# Patient Record
Sex: MALE | Race: BLACK OR AFRICAN AMERICAN | NOT HISPANIC OR LATINO | Employment: FULL TIME | RURAL
[De-identification: names, ages, dates, MRNs, and addresses within clinical notes are randomized per-mention and may not be internally consistent; named-entity substitution may affect disease eponyms.]

---

## 2021-07-23 ENCOUNTER — OFFICE VISIT (OUTPATIENT)
Dept: FAMILY MEDICINE | Facility: CLINIC | Age: 26
End: 2021-07-23

## 2021-07-23 VITALS
HEIGHT: 67 IN | BODY MASS INDEX: 47.09 KG/M2 | WEIGHT: 300 LBS | DIASTOLIC BLOOD PRESSURE: 104 MMHG | RESPIRATION RATE: 18 BRPM | HEART RATE: 58 BPM | TEMPERATURE: 98 F | OXYGEN SATURATION: 99 % | SYSTOLIC BLOOD PRESSURE: 152 MMHG

## 2021-07-23 DIAGNOSIS — Z20.822 EXPOSURE TO COVID-19 VIRUS: Primary | ICD-10-CM

## 2021-07-23 DIAGNOSIS — R42 DIZZINESS: ICD-10-CM

## 2021-07-23 DIAGNOSIS — H66.92 LEFT OTITIS MEDIA, UNSPECIFIED OTITIS MEDIA TYPE: ICD-10-CM

## 2021-07-23 LAB
CTP QC/QA: YES
SARS-COV-2 AG RESP QL IA.RAPID: NEGATIVE

## 2021-07-23 PROCEDURE — 99203 PR OFFICE/OUTPT VISIT, NEW, LEVL III, 30-44 MIN: ICD-10-PCS | Mod: 25,,, | Performed by: NURSE PRACTITIONER

## 2021-07-23 PROCEDURE — 87426 SARSCOV CORONAVIRUS AG IA: CPT | Mod: QW,,, | Performed by: NURSE PRACTITIONER

## 2021-07-23 PROCEDURE — 96372 THER/PROPH/DIAG INJ SC/IM: CPT | Mod: ,,, | Performed by: NURSE PRACTITIONER

## 2021-07-23 PROCEDURE — 99203 OFFICE O/P NEW LOW 30 MIN: CPT | Mod: 25,,, | Performed by: NURSE PRACTITIONER

## 2021-07-23 PROCEDURE — 96372 PR INJECTION,THERAP/PROPH/DIAG2ST, IM OR SUBCUT: ICD-10-PCS | Mod: ,,, | Performed by: NURSE PRACTITIONER

## 2021-07-23 PROCEDURE — 87426 SARS CORONAVIRUS 2 ANTIGEN POCT: ICD-10-PCS | Mod: QW,,, | Performed by: NURSE PRACTITIONER

## 2021-07-23 RX ORDER — PREDNISONE 20 MG/1
40 TABLET ORAL DAILY
Qty: 10 TABLET | Refills: 0 | Status: SHIPPED | OUTPATIENT
Start: 2021-07-23 | End: 2021-07-28

## 2021-07-23 RX ORDER — CEFDINIR 300 MG/1
300 CAPSULE ORAL 2 TIMES DAILY
Qty: 20 CAPSULE | Refills: 0 | Status: SHIPPED | OUTPATIENT
Start: 2021-07-23 | End: 2021-08-02

## 2021-07-23 RX ORDER — DEXAMETHASONE SODIUM PHOSPHATE 4 MG/ML
6 INJECTION, SOLUTION INTRA-ARTICULAR; INTRALESIONAL; INTRAMUSCULAR; INTRAVENOUS; SOFT TISSUE ONCE
Status: COMPLETED | OUTPATIENT
Start: 2021-07-23 | End: 2021-07-23

## 2021-07-23 RX ORDER — MECLIZINE HCL 12.5 MG 12.5 MG/1
12.5 TABLET ORAL 3 TIMES DAILY PRN
Qty: 30 TABLET | Refills: 0 | Status: SHIPPED | OUTPATIENT
Start: 2021-07-23 | End: 2023-03-16

## 2021-07-23 RX ADMIN — DEXAMETHASONE SODIUM PHOSPHATE 6 MG: 4 INJECTION, SOLUTION INTRA-ARTICULAR; INTRALESIONAL; INTRAMUSCULAR; INTRAVENOUS; SOFT TISSUE at 11:07

## 2021-09-23 ENCOUNTER — CLINICAL SUPPORT (OUTPATIENT)
Dept: PRIMARY CARE CLINIC | Facility: CLINIC | Age: 26
End: 2021-09-23

## 2021-09-23 DIAGNOSIS — Z02.4 ENCOUNTER FOR DEPARTMENT OF TRANSPORTATION (DOT) EXAMINATION FOR DRIVING LICENSE RENEWAL: ICD-10-CM

## 2021-09-23 PROCEDURE — 99499 UNLISTED E&M SERVICE: CPT | Mod: ,,, | Performed by: NURSE PRACTITIONER

## 2021-09-23 PROCEDURE — 99499 PR PHYSICAL - DOT/CDL: ICD-10-PCS | Mod: ,,, | Performed by: NURSE PRACTITIONER

## 2021-12-21 ENCOUNTER — CLINICAL SUPPORT (OUTPATIENT)
Dept: PRIMARY CARE CLINIC | Facility: CLINIC | Age: 26
End: 2021-12-21

## 2021-12-21 DIAGNOSIS — Z02.4 ENCOUNTER FOR COMMERCIAL DRIVER MEDICAL EXAMINATION (CDME): Primary | ICD-10-CM

## 2021-12-21 PROCEDURE — 99499 PR PHYSICAL - DOT/CDL: ICD-10-PCS | Mod: ,,, | Performed by: NURSE PRACTITIONER

## 2021-12-21 PROCEDURE — 99499 UNLISTED E&M SERVICE: CPT | Mod: ,,, | Performed by: NURSE PRACTITIONER

## 2022-03-21 ENCOUNTER — CLINICAL SUPPORT (OUTPATIENT)
Dept: PRIMARY CARE CLINIC | Facility: CLINIC | Age: 27
End: 2022-03-21

## 2022-03-21 DIAGNOSIS — Z02.4 ENCOUNTER FOR DEPARTMENT OF TRANSPORTATION (DOT) EXAMINATION FOR DRIVING LICENSE RENEWAL: ICD-10-CM

## 2022-03-21 PROCEDURE — 99499 PR PHYSICAL - DOT/CDL: ICD-10-PCS | Mod: ,,, | Performed by: NURSE PRACTITIONER

## 2022-03-21 PROCEDURE — 99499 UNLISTED E&M SERVICE: CPT | Mod: ,,, | Performed by: NURSE PRACTITIONER

## 2022-03-21 NOTE — PROGRESS NOTES
Subjective:       Patient ID: Ray Amaya is a 26 y.o. male.    Chief Complaint: No chief complaint on file.    HPI  Review of Systems      Objective:      Physical Exam    Assessment:       Problem List Items Addressed This Visit    None     Visit Diagnoses     Encounter for Department of Transportation (DOT) examination for driving license renewal              Plan:       See scanned documents in .

## 2022-12-28 ENCOUNTER — OFFICE VISIT (OUTPATIENT)
Dept: FAMILY MEDICINE | Facility: CLINIC | Age: 27
End: 2022-12-28
Payer: COMMERCIAL

## 2022-12-28 VITALS
TEMPERATURE: 98 F | WEIGHT: 300 LBS | SYSTOLIC BLOOD PRESSURE: 114 MMHG | OXYGEN SATURATION: 98 % | HEART RATE: 103 BPM | RESPIRATION RATE: 18 BRPM | HEIGHT: 67 IN | DIASTOLIC BLOOD PRESSURE: 50 MMHG | BODY MASS INDEX: 47.09 KG/M2

## 2022-12-28 DIAGNOSIS — J02.9 PHARYNGITIS, UNSPECIFIED ETIOLOGY: ICD-10-CM

## 2022-12-28 DIAGNOSIS — Z20.822 COUGH WITH EXPOSURE TO COVID-19 VIRUS: Primary | ICD-10-CM

## 2022-12-28 DIAGNOSIS — R05.8 COUGH WITH EXPOSURE TO COVID-19 VIRUS: Primary | ICD-10-CM

## 2022-12-28 DIAGNOSIS — J10.1 INFLUENZA A: ICD-10-CM

## 2022-12-28 LAB
CTP QC/QA: YES
FLUAV AG NPH QL: POSITIVE
FLUBV AG NPH QL: NEGATIVE
SARS-COV-2 AG RESP QL IA.RAPID: NEGATIVE

## 2022-12-28 PROCEDURE — 3074F PR MOST RECENT SYSTOLIC BLOOD PRESSURE < 130 MM HG: ICD-10-PCS | Mod: ,,, | Performed by: FAMILY MEDICINE

## 2022-12-28 PROCEDURE — 3008F PR BODY MASS INDEX (BMI) DOCUMENTED: ICD-10-PCS | Mod: ,,, | Performed by: FAMILY MEDICINE

## 2022-12-28 PROCEDURE — 1159F PR MEDICATION LIST DOCUMENTED IN MEDICAL RECORD: ICD-10-PCS | Mod: ,,, | Performed by: FAMILY MEDICINE

## 2022-12-28 PROCEDURE — 99213 PR OFFICE/OUTPT VISIT, EST, LEVL III, 20-29 MIN: ICD-10-PCS | Mod: ,,, | Performed by: FAMILY MEDICINE

## 2022-12-28 PROCEDURE — 3078F PR MOST RECENT DIASTOLIC BLOOD PRESSURE < 80 MM HG: ICD-10-PCS | Mod: ,,, | Performed by: FAMILY MEDICINE

## 2022-12-28 PROCEDURE — 1159F MED LIST DOCD IN RCRD: CPT | Mod: ,,, | Performed by: FAMILY MEDICINE

## 2022-12-28 PROCEDURE — 87428 SARSCOV & INF VIR A&B AG IA: CPT | Mod: QW,,, | Performed by: FAMILY MEDICINE

## 2022-12-28 PROCEDURE — 87428 POCT SARS-COV2 (COVID) WITH FLU ANTIGEN: ICD-10-PCS | Mod: QW,,, | Performed by: FAMILY MEDICINE

## 2022-12-28 PROCEDURE — 3078F DIAST BP <80 MM HG: CPT | Mod: ,,, | Performed by: FAMILY MEDICINE

## 2022-12-28 PROCEDURE — 3008F BODY MASS INDEX DOCD: CPT | Mod: ,,, | Performed by: FAMILY MEDICINE

## 2022-12-28 PROCEDURE — 99213 OFFICE O/P EST LOW 20 MIN: CPT | Mod: ,,, | Performed by: FAMILY MEDICINE

## 2022-12-28 PROCEDURE — 3074F SYST BP LT 130 MM HG: CPT | Mod: ,,, | Performed by: FAMILY MEDICINE

## 2022-12-28 RX ORDER — AMOXICILLIN AND CLAVULANATE POTASSIUM 875; 125 MG/1; MG/1
1 TABLET, FILM COATED ORAL EVERY 12 HOURS
Qty: 14 TABLET | Refills: 0 | Status: SHIPPED | OUTPATIENT
Start: 2022-12-28 | End: 2023-01-04

## 2022-12-28 RX ORDER — OSELTAMIVIR PHOSPHATE 75 MG/1
75 CAPSULE ORAL 2 TIMES DAILY
Qty: 10 CAPSULE | Refills: 0 | Status: SHIPPED | OUTPATIENT
Start: 2022-12-28 | End: 2023-01-02

## 2022-12-28 NOTE — PROGRESS NOTES
"Subjective:       Patient ID: Ray Amaya is a 27 y.o. male.    Chief Complaint: COVID-19 Concerns (Positive home test last Thursday , continues to have symptoms "the seem worse", all family members were on a cruise and have tested poisitive), Nasal Congestion, Cough, Fatigue, and Fever    27 year old male with no PMHx came in for covid testing. He was on a cruise 8 days ago when he started feeling sick. Sickness started with congestion runny nose and weakness. A few days ago he developed a sore throat as well.   Review of Systems   All other systems reviewed and are negative.      Objective:      Physical Exam  Vitals and nursing note reviewed.   Constitutional:       General: He is not in acute distress.     Appearance: Normal appearance. He is normal weight. He is ill-appearing. He is not toxic-appearing.   HENT:      Head: Normocephalic.      Right Ear: Tympanic membrane, ear canal and external ear normal. There is no impacted cerumen.      Left Ear: Tympanic membrane, ear canal and external ear normal. There is no impacted cerumen.      Nose: Congestion and rhinorrhea present.      Mouth/Throat:      Mouth: Mucous membranes are moist.      Pharynx: Oropharyngeal exudate and posterior oropharyngeal erythema present.   Eyes:      Conjunctiva/sclera: Conjunctivae normal.   Cardiovascular:      Rate and Rhythm: Normal rate and regular rhythm.      Pulses: Normal pulses.      Heart sounds: Normal heart sounds.   Pulmonary:      Effort: Pulmonary effort is normal. No respiratory distress.      Breath sounds: Normal breath sounds. No wheezing or rales.   Abdominal:      General: Abdomen is flat.   Skin:     General: Skin is warm.      Coloration: Skin is not jaundiced.      Findings: No bruising or lesion.   Neurological:      Mental Status: He is alert and oriented to person, place, and time.   Psychiatric:         Behavior: Behavior normal.       Assessment:       Problem List Items Addressed This Visit  "         ENT    Pharyngitis     Purulent exudates on exam  Augmentin          Relevant Medications    amoxicillin-clavulanate 875-125mg (AUGMENTIN) 875-125 mg per tablet       ID    Influenza A     Tested positive  Tamiflu ordered         Relevant Medications    oseltamivir (TAMIFLU) 75 MG capsule     Other Visit Diagnoses       Cough with exposure to COVID-19 virus    -  Primary    Relevant Orders    POCT SARS-COV2 (COVID) with Flu Antigen (Completed)              Plan:       Cough with exposure to COVID-19 virus  -     POCT SARS-COV2 (COVID) with Flu Antigen    Influenza A  -     oseltamivir (TAMIFLU) 75 MG capsule; Take 1 capsule (75 mg total) by mouth 2 (two) times daily. for 5 days  Dispense: 10 capsule; Refill: 0    Pharyngitis, unspecified etiology  -     amoxicillin-clavulanate 875-125mg (AUGMENTIN) 875-125 mg per tablet; Take 1 tablet by mouth every 12 (twelve) hours. for 7 days  Dispense: 14 tablet; Refill: 0

## 2023-01-04 NOTE — PROGRESS NOTES
I have reviewed the notes, assessments, and/or procedures performed by Dr. Ferreira, I concur with his documentation of Ray Amaya.

## 2023-03-16 ENCOUNTER — CLINICAL SUPPORT (OUTPATIENT)
Dept: PRIMARY CARE CLINIC | Facility: CLINIC | Age: 28
End: 2023-03-16

## 2023-03-16 DIAGNOSIS — Z02.4 ENCOUNTER FOR DEPARTMENT OF TRANSPORTATION (DOT) EXAMINATION FOR DRIVING LICENSE RENEWAL: Primary | ICD-10-CM

## 2023-03-16 PROCEDURE — 99499 PR PHYSICAL - DOT/CDL: ICD-10-PCS | Mod: ,,, | Performed by: NURSE PRACTITIONER

## 2023-03-16 PROCEDURE — 99499 UNLISTED E&M SERVICE: CPT | Mod: ,,, | Performed by: NURSE PRACTITIONER

## 2023-03-16 NOTE — PROGRESS NOTES
Subjective:       Patient ID: Ray Amaya is a 27 y.o. male.    Chief Complaint: No chief complaint on file.    HPI  Review of Systems      Objective:      Physical Exam    Assessment:       Problem List Items Addressed This Visit    None  Visit Diagnoses       Encounter for Department of Transportation (DOT) examination for driving license renewal    -  Primary            Plan:       See scanned documents in .

## 2023-07-05 ENCOUNTER — PATIENT MESSAGE (OUTPATIENT)
Dept: RESEARCH | Facility: HOSPITAL | Age: 28
End: 2023-07-05

## 2024-02-10 ENCOUNTER — OFFICE VISIT (OUTPATIENT)
Dept: FAMILY MEDICINE | Facility: CLINIC | Age: 29
End: 2024-02-10
Payer: COMMERCIAL

## 2024-02-10 VITALS
WEIGHT: 300 LBS | SYSTOLIC BLOOD PRESSURE: 150 MMHG | DIASTOLIC BLOOD PRESSURE: 87 MMHG | BODY MASS INDEX: 47.09 KG/M2 | RESPIRATION RATE: 18 BRPM | OXYGEN SATURATION: 98 % | HEART RATE: 89 BPM | HEIGHT: 67 IN | TEMPERATURE: 99 F

## 2024-02-10 DIAGNOSIS — M54.42 ACUTE MIDLINE LOW BACK PAIN WITH LEFT-SIDED SCIATICA: Primary | ICD-10-CM

## 2024-02-10 DIAGNOSIS — L02.91 CUTANEOUS ABSCESS, UNSPECIFIED SITE: ICD-10-CM

## 2024-02-10 PROCEDURE — 99051 MED SERV EVE/WKEND/HOLIDAY: CPT | Mod: ,,, | Performed by: FAMILY MEDICINE

## 2024-02-10 PROCEDURE — 3008F BODY MASS INDEX DOCD: CPT | Mod: CPTII,,, | Performed by: FAMILY MEDICINE

## 2024-02-10 PROCEDURE — 3079F DIAST BP 80-89 MM HG: CPT | Mod: CPTII,,, | Performed by: FAMILY MEDICINE

## 2024-02-10 PROCEDURE — 99214 OFFICE O/P EST MOD 30 MIN: CPT | Mod: ,,, | Performed by: FAMILY MEDICINE

## 2024-02-10 PROCEDURE — 1159F MED LIST DOCD IN RCRD: CPT | Mod: CPTII,,, | Performed by: FAMILY MEDICINE

## 2024-02-10 PROCEDURE — 3077F SYST BP >= 140 MM HG: CPT | Mod: CPTII,,, | Performed by: FAMILY MEDICINE

## 2024-02-10 RX ORDER — TIZANIDINE 4 MG/1
TABLET ORAL
Qty: 60 TABLET | Refills: 2 | Status: SHIPPED | OUTPATIENT
Start: 2024-02-10 | End: 2024-05-13 | Stop reason: SDUPTHER

## 2024-02-10 RX ORDER — SULFAMETHOXAZOLE AND TRIMETHOPRIM 800; 160 MG/1; MG/1
1 TABLET ORAL 2 TIMES DAILY
Qty: 20 TABLET | Refills: 1 | Status: SHIPPED | OUTPATIENT
Start: 2024-02-10 | End: 2024-03-05

## 2024-02-10 RX ORDER — TRAMADOL HYDROCHLORIDE 50 MG/1
50 TABLET ORAL EVERY 6 HOURS
Qty: 15 TABLET | Refills: 0 | Status: SHIPPED | OUTPATIENT
Start: 2024-02-10 | End: 2024-03-05

## 2024-02-10 NOTE — PROGRESS NOTES
Subjective     Patient ID: Ray Amaya is a 28 y.o. male.    Chief Complaint: Back Pain (Lower back pain. X Wednesday. Pain radiates down the left leg. No trouble with urinating. ), Rash (Located on bilateral arms, stomach, chest, neck and under the left eye. ), and Cyst (Located on the upper right side of back. Yellow drainage. No pain. No odor. )    Pain began after heavy lifting.  Pain radiates down to his left lower leg.  No numbness or weakness.  Sharp pain.  Patient has an abscess in his right upper back and right forearm.  He said he has had these before.  No fever.  Both areas are draining    Back Pain    Rash    Cyst  Associated symptoms include a rash.     Review of Systems   Musculoskeletal:  Positive for back pain.   Integumentary:  Positive for rash.          Objective     Physical Exam  Constitutional:       General: He is in acute distress.      Appearance: He is not ill-appearing.   Cardiovascular:      Rate and Rhythm: Normal rate and regular rhythm.   Musculoskeletal:      Lumbar back: Spasms and tenderness present. Negative right straight leg raise test and negative left straight leg raise test.        Back:    Skin:     Findings: Lesion (abscess right forearm and right upper back.  The abscess on his forearm is somewhat tense but he has a small draining area.) present.   Neurological:      Mental Status: He is alert.      Motor: No weakness.      Deep Tendon Reflexes: Reflexes normal.            Assessment and Plan     1. Acute midline low back pain with left-sided sciatica  -     X-Ray Lumbar Spine AP And Lateral; Future; Expected date: 02/10/2024  -     Ambulatory referral/consult to Chiropractic; Future; Expected date: 02/17/2024    2. Cutaneous abscess, unspecified site    Other orders  -     sulfamethoxazole-trimethoprim 800-160mg (BACTRIM DS) 800-160 mg Tab; Take 1 tablet by mouth 2 (two) times daily.  Dispense: 20 tablet; Refill: 1  -     tiZANidine (ZANAFLEX) 4 MG tablet; 1 or  2 at bedtime as needed for muscle spasm  Dispense: 60 tablet; Refill: 2  -     traMADoL (ULTRAM) 50 mg tablet; Take 1 tablet (50 mg total) by mouth every 6 (six) hours.  Dispense: 15 tablet; Refill: 0        If the abscess on right forearm does not drained spontaneously he will call for referral to surgery for incision and drainage     Patient referred to Dr. Saldivar for chiropractic care.  If he develops any weakness in his left lower extremity he will come in for recheck    No follow-ups on file.

## 2024-02-27 ENCOUNTER — OFFICE VISIT (OUTPATIENT)
Dept: FAMILY MEDICINE | Facility: CLINIC | Age: 29
End: 2024-02-27
Payer: COMMERCIAL

## 2024-02-27 VITALS
OXYGEN SATURATION: 98 % | RESPIRATION RATE: 16 BRPM | HEART RATE: 75 BPM | SYSTOLIC BLOOD PRESSURE: 129 MMHG | WEIGHT: 303.63 LBS | TEMPERATURE: 98 F | DIASTOLIC BLOOD PRESSURE: 71 MMHG | BODY MASS INDEX: 47.65 KG/M2 | HEIGHT: 67 IN

## 2024-02-27 DIAGNOSIS — M54.50 ACUTE LEFT-SIDED LOW BACK PAIN WITHOUT SCIATICA: Primary | ICD-10-CM

## 2024-02-27 DIAGNOSIS — L02.413 ABSCESS OF ARM, RIGHT: ICD-10-CM

## 2024-02-27 PROCEDURE — 3008F BODY MASS INDEX DOCD: CPT | Mod: CPTII,,, | Performed by: FAMILY MEDICINE

## 2024-02-27 PROCEDURE — 3078F DIAST BP <80 MM HG: CPT | Mod: CPTII,,, | Performed by: FAMILY MEDICINE

## 2024-02-27 PROCEDURE — 3074F SYST BP LT 130 MM HG: CPT | Mod: CPTII,,, | Performed by: FAMILY MEDICINE

## 2024-02-27 PROCEDURE — 99212 OFFICE O/P EST SF 10 MIN: CPT | Mod: ,,, | Performed by: FAMILY MEDICINE

## 2024-02-27 PROCEDURE — 1160F RVW MEDS BY RX/DR IN RCRD: CPT | Mod: CPTII,,, | Performed by: FAMILY MEDICINE

## 2024-02-27 PROCEDURE — 1159F MED LIST DOCD IN RCRD: CPT | Mod: CPTII,,, | Performed by: FAMILY MEDICINE

## 2024-02-27 NOTE — PROGRESS NOTES
"      Suleiman Davis MD    [address], [place] [address]  Phone: [phone]     Subjective     Name: Ray Amaya   YOB: 1995 (28 y.o.)  MRN: [id number]  Visit Date: 2/27/2024   Chief Complaint: Follow-up (FEVER ON [id number], FATIGUE, BODY ACHES, WEAK SENT HOME FROM WORK IN [place], HOME TEST FOR COVID SATURDAY NEGATIVE, FEELS FINE TODAY  WOULD LIKE TO RETURN TO WORK, NEEDS WORK EXCUSE TO RETURN TODAY IF POSSIBLE) and Leg Pain (LOW BACK INJURY 1 MONTTH AGO AT WORK, IT IS BETTER, "PULLED MUSCLE", NOW HAS DISCOMFORT IN LEFT LEG AND KNEE)        HISTORY OF PRESENT ILLNESS:  Mr. Ray Amaya 28-year-old  without any significant past medical history who presents to the [facility] for acute lower back discomfort for the past few weeks. Reports that while at work he did heavy lifting a turp with roughly 30-50 lbs and felt left lower back pain with radiating to the left anterior of the leg. Patient did follow-up with a Dr. Home Saldivar on 02/10. At that time, imaging disclosed no acute evident. Encouraged to visit a chiropractor. States taking Ibuprofen with relief.     In addition, patient disclosed that he was feeling sick on last week with symptoms of weakness, light-headiness, chills and fever, in which, prompt visit to the [facility] ED. Patient went to the local pharmacy and took a home COVID test that was negative. At visit today, symptoms has resolved. States that he has a right arm abscess for past few days that drains a little. Encouraged to apply warm cloth compression for 15-minutes QID. He was started on Bactrim and ointment. RTC in one week for possible I&D and back pain discomfort.         PAST MEDICAL HISTORY:  Significant Diagnoses - Patient  has no past medical history on file.  Medications - Patient is not on any long-term medications.   Allergies - Patient has No Known Allergies.  Surgeries - Patient  has no past " "surgical history on file.  Family History - Patient family history is not on file.      SOCIAL HISTORY:  Tobacco - Patient  reports that he has been smoking. He has never used smokeless tobacco.   Alcohol - Patient  reports current alcohol use.   Recreational Drugs - Patient  reports no history of drug use.       Review of Systems   Constitutional: Negative.    HENT: Negative.     Eyes: Negative.    Respiratory: Negative.     Cardiovascular: Negative.    Gastrointestinal: Negative.    Musculoskeletal:  Positive for back pain and myalgias.   Neurological: Negative.         History reviewed. No pertinent past medical history.     Review of patient's allergies indicates:  No Known Allergies     History reviewed. No pertinent surgical history.     History reviewed. No pertinent family history.    Current Outpatient Medications   Medication Instructions    sulfamethoxazole-trimethoprim 800-160mg (BACTRIM DS) 800-160 mg Tab 1 tablet, Oral, 2 times daily    tiZANidine (ZANAFLEX) 4 MG tablet 1 or 2 at bedtime as needed for muscle spasm    traMADoL (ULTRAM) 50 mg, Oral, Every 6 hours        Objective     /71 (BP Location: Left arm, Patient Position: Sitting, BP Method: Large (Automatic))   Pulse 75   Temp 98.2 °F (36.8 °C) (Oral)   Resp 16   Ht 5' 7" (1.702 m)   Wt (!) 137.7 kg (303 lb 9.6 oz)   SpO2 98%   BMI 47.55 kg/m²     Physical Exam  Vitals and nursing note reviewed.   Constitutional:       General: He is not in acute distress.     Appearance: Normal appearance. He is not ill-appearing.   HENT:      Head: Normocephalic and atraumatic.   Cardiovascular:      Rate and Rhythm: Normal rate.      Heart sounds: No murmur heard.     No friction rub. No gallop.   Pulmonary:      Effort: No respiratory distress.   Chest:      Chest wall: No tenderness.   Abdominal:      General: There is no distension.   Musculoskeletal:         General: Tenderness present. No swelling.      Cervical back: Normal range of motion. "      Right lower leg: No edema.      Left lower leg: No edema.      Comments: Left lower back tenderness; left leg anterior tenderness below the patella    Neurological:      Mental Status: He is alert.        All recently obtained labs have been reviewed and discussed in detail with the patient.   Assessment     1. Acute left-sided low back pain without sciatica    2. Abscess of arm, right         Plan     Problem List Items Addressed This Visit          ID    Abscess of arm, right     02/27  - Reports right arm abscess for the past weeks  -Started on Bactrim 800-160 and applying ointment from previous provider  -Encouraged to continue treatment; RTC in one week for possible I & D            Orthopedic    Low back pain - Primary     02/27  - Reports that on last Friday lifted a roughly 30-50 lbs tarp and experienced left lower discomfort with radiating to the left anterior leg  -started taking Ibuprofen with relief.   -States having imaging done at another facility but no results reviewed; per chart review imaging was unremarkable  -Patient was referred to Chiropractor from previous provider; encouraged PT  -At this time, PT and/or Chiropractor, tylenol for pain; started on Zanaflex 4 mg from previous provider    -RTC in one-week                All orders:          Follow up in about 1 week (around 3/5/2024).    Instructed patient that if symptoms fail to improve or worsen patient should seek immediate medical attention or report to the nearest emergency department. Patient expressed verbal agreement and understanding to this plan of care.   Suleiman Davis MD  Family Medicine Residency PGY-1    [facility]

## 2024-02-27 NOTE — ASSESSMENT & PLAN NOTE
02/27  - Reports right arm abscess for the past weeks  -Started on Bactrim 800-160 and applying ointment from previous provider  -Encouraged to continue treatment; RTC in one week for possible I & D

## 2024-02-27 NOTE — PROGRESS NOTES
I have reviewed the notes, assessments, and/or procedures performed by DR OVALLE,   I concur with his documentation of Ray Amaya.  Date of Service: 2/27/2024   Monitor WORSENING SIGNS OF INFECTION WITH ABSCESS NOT FLUCTUANT TODAY,Low back pain went over red flags signs symptoms worsening pain, bowel bladder anesthesia  Evaluate low back sprain after lifting heavy tarp suspected pulled paravertebral muscle  Assess acute low back pain after heavy lifting  Treat referral for chiropractor and consider PT referral as well and zanaflex return for follow-up in 1 week for possible abscess drainage.

## 2024-02-27 NOTE — ASSESSMENT & PLAN NOTE
02/27  - Reports that on last Friday lifted a roughly 30-50 lbs tarp and experienced left lower discomfort with radiating to the left anterior leg  -started taking Ibuprofen with relief.   -States having imaging done at another facility but no results reviewed; per chart review imaging was unremarkable  -Patient was referred to Chiropractor from previous provider; encouraged PT  -At this time, PT and/or Chiropractor, tylenol for pain; started on Zanaflex 4 mg from previous provider    -RTC in one-week

## 2024-03-05 ENCOUNTER — OFFICE VISIT (OUTPATIENT)
Dept: FAMILY MEDICINE | Facility: CLINIC | Age: 29
End: 2024-03-05
Payer: COMMERCIAL

## 2024-03-05 VITALS
OXYGEN SATURATION: 99 % | SYSTOLIC BLOOD PRESSURE: 132 MMHG | RESPIRATION RATE: 18 BRPM | WEIGHT: 308 LBS | DIASTOLIC BLOOD PRESSURE: 88 MMHG | BODY MASS INDEX: 48.34 KG/M2 | HEART RATE: 72 BPM | TEMPERATURE: 98 F | HEIGHT: 67 IN

## 2024-03-05 DIAGNOSIS — Z11.3 ROUTINE SCREENING FOR STI (SEXUALLY TRANSMITTED INFECTION): ICD-10-CM

## 2024-03-05 DIAGNOSIS — G47.33 OSA (OBSTRUCTIVE SLEEP APNEA): ICD-10-CM

## 2024-03-05 DIAGNOSIS — Z76.89 ENCOUNTER TO ESTABLISH CARE: Primary | ICD-10-CM

## 2024-03-05 DIAGNOSIS — Z23 NEED FOR VACCINATION: ICD-10-CM

## 2024-03-05 PROBLEM — Z11.59 NEED FOR HEPATITIS C SCREENING TEST: Status: ACTIVE | Noted: 2024-03-05

## 2024-03-05 PROBLEM — Z20.822 EXPOSURE TO COVID-19 VIRUS: Status: RESOLVED | Noted: 2021-07-23 | Resolved: 2024-03-05

## 2024-03-05 PROBLEM — Z11.4 SCREENING FOR HIV (HUMAN IMMUNODEFICIENCY VIRUS): Status: RESOLVED | Noted: 2024-03-05 | Resolved: 2024-03-05

## 2024-03-05 PROBLEM — J02.9 PHARYNGITIS: Status: RESOLVED | Noted: 2022-12-28 | Resolved: 2024-03-05

## 2024-03-05 PROBLEM — Z11.4 SCREENING FOR HIV (HUMAN IMMUNODEFICIENCY VIRUS): Status: ACTIVE | Noted: 2024-03-05

## 2024-03-05 PROBLEM — Z11.59 NEED FOR HEPATITIS C SCREENING TEST: Status: RESOLVED | Noted: 2024-03-05 | Resolved: 2024-03-05

## 2024-03-05 PROBLEM — J10.1 INFLUENZA A: Status: RESOLVED | Noted: 2022-12-28 | Resolved: 2024-03-05

## 2024-03-05 PROBLEM — H66.92 LEFT OTITIS MEDIA: Status: RESOLVED | Noted: 2021-07-23 | Resolved: 2024-03-05

## 2024-03-05 LAB
ALBUMIN SERPL BCP-MCNC: 3.9 G/DL (ref 3.5–5)
ALBUMIN/GLOB SERPL: 1.1 {RATIO}
ALP SERPL-CCNC: 54 U/L (ref 45–115)
ALT SERPL W P-5'-P-CCNC: 65 U/L (ref 16–61)
ANION GAP SERPL CALCULATED.3IONS-SCNC: 7 MMOL/L (ref 7–16)
ANISOCYTOSIS BLD QL SMEAR: ABNORMAL
AST SERPL W P-5'-P-CCNC: 27 U/L (ref 15–37)
BASOPHILS # BLD AUTO: 0.04 K/UL (ref 0–0.2)
BASOPHILS NFR BLD AUTO: 0.7 % (ref 0–1)
BILIRUB SERPL-MCNC: 0.4 MG/DL (ref ?–1.2)
BUN SERPL-MCNC: 9 MG/DL (ref 7–18)
BUN/CREAT SERPL: 8 (ref 6–20)
CALCIUM SERPL-MCNC: 9.8 MG/DL (ref 8.5–10.1)
CHLORIDE SERPL-SCNC: 108 MMOL/L (ref 98–107)
CHOLEST SERPL-MCNC: 192 MG/DL (ref 0–200)
CHOLEST/HDLC SERPL: 5.1 {RATIO}
CO2 SERPL-SCNC: 30 MMOL/L (ref 21–32)
CREAT SERPL-MCNC: 1.14 MG/DL (ref 0.7–1.3)
DIFFERENTIAL METHOD BLD: ABNORMAL
EGFR (NO RACE VARIABLE) (RUSH/TITUS): 89 ML/MIN/1.73M2
EOSINOPHIL # BLD AUTO: 0.1 K/UL (ref 0–0.5)
EOSINOPHIL NFR BLD AUTO: 1.7 % (ref 1–4)
ERYTHROCYTE [DISTWIDTH] IN BLOOD BY AUTOMATED COUNT: 15.4 % (ref 11.5–14.5)
GLOBULIN SER-MCNC: 3.7 G/DL (ref 2–4)
GLUCOSE SERPL-MCNC: 76 MG/DL (ref 74–106)
HCT VFR BLD AUTO: 43.4 % (ref 40–54)
HCV AB SER QL: NORMAL
HDLC SERPL-MCNC: 38 MG/DL (ref 40–60)
HGB BLD-MCNC: 13.9 G/DL (ref 13.5–18)
HIV 1+O+2 AB SERPL QL: NORMAL
IMM GRANULOCYTES # BLD AUTO: 0.01 K/UL (ref 0–0.04)
IMM GRANULOCYTES NFR BLD: 0.2 % (ref 0–0.4)
LDLC SERPL CALC-MCNC: 123 MG/DL
LDLC/HDLC SERPL: 3.2 {RATIO}
LYMPHOCYTES # BLD AUTO: 2.5 K/UL (ref 1–4.8)
LYMPHOCYTES NFR BLD AUTO: 42.4 % (ref 27–41)
MCH RBC QN AUTO: 23.5 PG (ref 27–31)
MCHC RBC AUTO-ENTMCNC: 32 G/DL (ref 32–36)
MCV RBC AUTO: 73.3 FL (ref 80–96)
MICROCYTES BLD QL SMEAR: ABNORMAL
MONOCYTES # BLD AUTO: 0.72 K/UL (ref 0–0.8)
MONOCYTES NFR BLD AUTO: 12.2 % (ref 2–6)
MPC BLD CALC-MCNC: 11 FL (ref 9.4–12.4)
NEUTROPHILS # BLD AUTO: 2.52 K/UL (ref 1.8–7.7)
NEUTROPHILS NFR BLD AUTO: 42.8 % (ref 53–65)
NONHDLC SERPL-MCNC: 154 MG/DL
NRBC # BLD AUTO: 0 X10E3/UL
NRBC, AUTO (.00): 0 %
OVALOCYTES BLD QL SMEAR: ABNORMAL
PLATELET # BLD AUTO: 305 K/UL (ref 150–400)
PLATELET MORPHOLOGY: ABNORMAL
POTASSIUM SERPL-SCNC: 4.7 MMOL/L (ref 3.5–5.1)
PROT SERPL-MCNC: 7.6 G/DL (ref 6.4–8.2)
RBC # BLD AUTO: 5.92 M/UL (ref 4.6–6.2)
SODIUM SERPL-SCNC: 140 MMOL/L (ref 136–145)
SYPHILIS AB INTERPRETATION: NORMAL
TRIGL SERPL-MCNC: 154 MG/DL (ref 35–150)
VLDLC SERPL-MCNC: 31 MG/DL
WBC # BLD AUTO: 5.89 K/UL (ref 4.5–11)

## 2024-03-05 PROCEDURE — 86803 HEPATITIS C AB TEST: CPT | Mod: ,,, | Performed by: CLINICAL MEDICAL LABORATORY

## 2024-03-05 PROCEDURE — 1160F RVW MEDS BY RX/DR IN RCRD: CPT | Mod: CPTII,,, | Performed by: FAMILY MEDICINE

## 2024-03-05 PROCEDURE — 83036 HEMOGLOBIN GLYCOSYLATED A1C: CPT | Mod: GZ,,, | Performed by: CLINICAL MEDICAL LABORATORY

## 2024-03-05 PROCEDURE — 90715 TDAP VACCINE 7 YRS/> IM: CPT | Mod: ,,, | Performed by: FAMILY MEDICINE

## 2024-03-05 PROCEDURE — 80053 COMPREHEN METABOLIC PANEL: CPT | Mod: ,,, | Performed by: CLINICAL MEDICAL LABORATORY

## 2024-03-05 PROCEDURE — 99214 OFFICE O/P EST MOD 30 MIN: CPT | Mod: 25,,, | Performed by: FAMILY MEDICINE

## 2024-03-05 PROCEDURE — 86780 TREPONEMA PALLIDUM: CPT | Mod: ,,, | Performed by: CLINICAL MEDICAL LABORATORY

## 2024-03-05 PROCEDURE — 3079F DIAST BP 80-89 MM HG: CPT | Mod: CPTII,,, | Performed by: FAMILY MEDICINE

## 2024-03-05 PROCEDURE — 3044F HG A1C LEVEL LT 7.0%: CPT | Mod: CPTII,,, | Performed by: FAMILY MEDICINE

## 2024-03-05 PROCEDURE — 87389 HIV-1 AG W/HIV-1&-2 AB AG IA: CPT | Mod: ,,, | Performed by: CLINICAL MEDICAL LABORATORY

## 2024-03-05 PROCEDURE — 87491 CHLMYD TRACH DNA AMP PROBE: CPT | Mod: ,,, | Performed by: CLINICAL MEDICAL LABORATORY

## 2024-03-05 PROCEDURE — 87591 N.GONORRHOEAE DNA AMP PROB: CPT | Mod: ,,, | Performed by: CLINICAL MEDICAL LABORATORY

## 2024-03-05 PROCEDURE — 90471 IMMUNIZATION ADMIN: CPT | Mod: ,,, | Performed by: FAMILY MEDICINE

## 2024-03-05 PROCEDURE — 1159F MED LIST DOCD IN RCRD: CPT | Mod: CPTII,,, | Performed by: FAMILY MEDICINE

## 2024-03-05 PROCEDURE — 85025 COMPLETE CBC W/AUTO DIFF WBC: CPT | Mod: GZ,,, | Performed by: CLINICAL MEDICAL LABORATORY

## 2024-03-05 PROCEDURE — 3008F BODY MASS INDEX DOCD: CPT | Mod: CPTII,,, | Performed by: FAMILY MEDICINE

## 2024-03-05 PROCEDURE — 3075F SYST BP GE 130 - 139MM HG: CPT | Mod: CPTII,,, | Performed by: FAMILY MEDICINE

## 2024-03-05 PROCEDURE — 80061 LIPID PANEL: CPT | Mod: ,,, | Performed by: CLINICAL MEDICAL LABORATORY

## 2024-03-05 NOTE — ASSESSMENT & PLAN NOTE
03/05  - Patient was seen on the 02/2024 for back pain due to work-related injury\  -At that time, encouraged to visit a PT  -Patient states that symptoms are improving  -At visit today, establishing patient-physician care  -Denies any PMH; has CPAP; referral sent to sleep medicine due to follow-up since 2016  -Ordered routine labs with STI screening;patient request  -Blood pressure was elevated at 141/90; repeat 132/88  - Discussed dieting and exercise to help with weigh-loss; patient a  with limited options of nutrients   - RTC in 3-month for follow-up

## 2024-03-05 NOTE — PROGRESS NOTES
Suleiman Davis MD    [address], [place] [address]  Phone: [phone]     Subjective     Name: Ray Amaya   YOB: 1995 (28 y.o.)  MRN: [id number]  Visit Date: 3/5/2024   Chief Complaint: Abscess (1 week follow up for abscess of right arm, he states it is much better.) and Health Maintenance (Care gaps addressed, patient declines his Flu and Pneumonia vaccines today, would like Tdap and HIV and Hepatitis C screening.)        HISTORY OF PRESENT ILLNESS:  Ray Amaya is a 28-year-old  male with no significant past medical history who presents to the [facility] for establish care and screenings. Reports ready to have a primary care provider. Patient is a  and over the road often. 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 Also, meal preparation can aid his success in meals. Encouraged patient to exercise daily; educated to walk on his breaks and to sign-up for gym membership on his off-days.    Patient Mentioned that he has a CPAP but will need to be refitted; has not seen a sleep medicine physician since year 2016. Referral placed. At this time, ordered routine lab with STI panels. Results pending. RTC in 3-months.            PAST MEDICAL HISTORY:  Significant Diagnoses - Patient  has no past medical history on file.  Medications - Patient is not on any long-term medications.   Allergies - Patient has No Known Allergies.  Surgeries - Patient  has a past surgical history that includes Tonsillectomy.  Family History - Patient family history includes Diabetes in his maternal grandmother; Hypertension in his father, maternal grandmother, and mother.      SOCIAL HISTORY:  Tobacco - Patient  reports that he has been smoking cigars. He has never used smokeless tobacco.  "  Alcohol - Patient  reports current alcohol use.   Recreational Drugs - Patient  reports no history of drug use.       Review of Systems   Constitutional: Negative.    Respiratory: Negative.     Cardiovascular: Negative.    Gastrointestinal: Negative.    Musculoskeletal:  Positive for myalgias.   Psychiatric/Behavioral: Negative.            History reviewed. No pertinent past medical history.     Review of patient's allergies indicates:  No Known Allergies     Past Surgical History:   Procedure Laterality Date    TONSILLECTOMY          Family History   Problem Relation Age of Onset    Hypertension Mother     Hypertension Father     Diabetes Maternal Grandmother     Hypertension Maternal Grandmother        Current Outpatient Medications   Medication Instructions    tiZANidine (ZANAFLEX) 4 MG tablet 1 or 2 at bedtime as needed for muscle spasm        Objective     /88 (BP Location: Left arm, Patient Position: Sitting, BP Method: Large (Manual))   Pulse 72   Temp 98.2 °F (36.8 °C) (Oral)   Resp 18   Ht 5' 7" (1.702 m)   Wt (!) 139.7 kg (308 lb)   SpO2 99%   BMI 48.24 kg/m²     Physical Exam  Vitals and nursing note reviewed.   Constitutional:       General: He is not in acute distress.     Appearance: Normal appearance. He is obese. He is not ill-appearing.   HENT:      Head: Normocephalic and atraumatic.      Nose: Nose normal.   Eyes:      Extraocular Movements: Extraocular movements intact.      Conjunctiva/sclera: Conjunctivae normal.   Cardiovascular:      Rate and Rhythm: Normal rate.      Heart sounds: No murmur heard.     No friction rub. No gallop.   Pulmonary:      Effort: No respiratory distress.   Chest:      Chest wall: No tenderness.   Abdominal:      General: There is no distension.   Musculoskeletal:         General: Tenderness present.      Cervical back: Normal range of motion.      Right lower leg: No edema.      Left lower leg: No edema.      Comments: Left-Lower tenderness; paraspinal " muscles    Skin:     Coloration: Skin is not jaundiced.      Findings: No erythema.   Neurological:      Mental Status: He is alert and oriented to person, place, and time.   Psychiatric:         Mood and Affect: Mood normal.         Thought Content: Thought content normal.          All recently obtained labs have been reviewed and discussed in detail with the patient.   Assessment     1. Encounter to establish care    2. Need for vaccination    3. Routine screening for STI (sexually transmitted infection)    4. PAULA (obstructive sleep apnea)         Plan     Problem List Items Addressed This Visit          ID    Need for vaccination     03/05  - Administered Tdap vaccine          Relevant Orders    (In Office Administered) Tdap Vaccine (Completed)    Routine screening for STI (sexually transmitted infection)     03/05  - Reports will like to be screen for any STI  -Ordered a panel   -Results pending          Relevant Orders    Chlamydia/GC, PCR    HIV 1/2 Ag/Ab (4th Gen)    Syphilis Antibody with reflex to RPR    Hepatitis C Antibody       Other    Encounter to establish care - Primary     03/05  - Patient was seen on the 02/2024 for back pain due to work-related injury\  -At that time, encouraged to visit a PT  -Patient states that symptoms are improving  -At visit today, establishing patient-physician care  -Denies any PMH; has CPAP; referral sent to sleep medicine due to follow-up since 2016  -Ordered routine labs with STI screening;patient request  -Blood pressure was elevated at 141/90; repeat 132/88  - Discussed dieting and exercise to help with weigh-loss; patient a  with limited options of nutrients   - RTC in 3-month for follow-up           Relevant Orders    Lipid Panel    CBC Auto Differential    Comprehensive Metabolic Panel    Hemoglobin A1C     Other Visit Diagnoses       PAULA (obstructive sleep apnea)        Relevant Orders    Ambulatory referral/consult to Sleep Disorders              All  orders:  Orders Placed This Encounter    Chlamydia/GC, PCR    (In Office Administered) Tdap Vaccine    Lipid Panel    CBC Auto Differential    Comprehensive Metabolic Panel    Hemoglobin A1C    HIV 1/2 Ag/Ab (4th Gen)    Syphilis Antibody with reflex to RPR    Hepatitis C Antibody    CBC with Differential          Follow up in about 3 months (around 6/5/2024).    Instructed patient that if symptoms fail to improve or worsen patient should seek immediate medical attention or report to the nearest emergency department. Patient expressed verbal agreement and understanding to this plan of care.   Suleiman Davis MD  Family Medicine Residency PGY-1    [facility]

## 2024-03-05 NOTE — LETTER
March 5, 2024      [facility]  [address]  [place] [address]  Phone: [phone]  Fax: [phone]       Patient: Ray Amaya   YOB: 1995  Date of Visit: 03/05/2024    To Whom It May Concern:    Sita Amaya  was at [facility] on 03/05/2024. The patient may return to work/school on 03/06/2023 with no restrictions. If you have any questions or concerns, or if I can be of further assistance, please do not hesitate to contact me.    Sincerely,    Suleiman Davis MD

## 2024-03-06 DIAGNOSIS — D50.8 IRON DEFICIENCY ANEMIA SECONDARY TO INADEQUATE DIETARY IRON INTAKE: Primary | ICD-10-CM

## 2024-03-06 LAB
CHLAMYDIA BY PCR: NEGATIVE
EST. AVERAGE GLUCOSE BLD GHB EST-MCNC: 108 MG/DL
HBA1C MFR BLD HPLC: 5.4 % (ref 4.5–6.6)
N. GONORRHOEAE (GC) BY PCR: NEGATIVE

## 2024-03-06 RX ORDER — FERROUS SULFATE 324(65)MG
324 TABLET, DELAYED RELEASE (ENTERIC COATED) ORAL DAILY
Qty: 90 TABLET | Refills: 0 | Status: SHIPPED | OUTPATIENT
Start: 2024-03-06 | End: 2024-06-04

## 2024-03-06 NOTE — PROGRESS NOTES
Spoke with patient via phone. Encouraged to modify diet. At this time, started on Ferrous sulfate with Vitamin C. RTC in 3-months for repeat labs

## 2024-03-07 NOTE — PROGRESS NOTES
I have reviewed the notes, assessments, and/or procedures performed by DR OVALLE, I concur with his documentation of Ray Amaya.  Date of Service: 3/5/2024  Encounter to establish care - Primary        03/05  - MONITOR  for back pain due to work-related injury\  -At that time, encouraged to visit a PT  -Patient states that symptoms are improving  -EVALUATE At visit today, establishing patient-physician care  -Denies any PMH; has CPAP; referral sent to sleep medicine due to follow-up since 2016  -Ordered routine labs with STI screening;patient request  -Blood pressure was elevated at 141/90; repeat 132/88  - ASSESSMENT ESTABLISH CARE VISIT,HIGH RISK SEX STD WORKUP  TREATMENT:Discussed dieting and exercise to help with weigh-loss; patient a  with limited options of nutrients ,STD check today.  - RTC in 3-month for follow-up             Relevant Orders     Lipid Panel     CBC Auto Differential     Comprehensive Metabolic Panel     Hemoglobin A1C

## 2024-04-22 DIAGNOSIS — G47.33 OSA (OBSTRUCTIVE SLEEP APNEA): Primary | ICD-10-CM

## 2024-04-28 ENCOUNTER — PROCEDURE VISIT (OUTPATIENT)
Dept: SLEEP MEDICINE | Facility: HOSPITAL | Age: 29
End: 2024-04-28
Payer: COMMERCIAL

## 2024-04-28 DIAGNOSIS — G47.33 OSA (OBSTRUCTIVE SLEEP APNEA): ICD-10-CM

## 2024-04-28 PROCEDURE — 95811 POLYSOM 6/>YRS CPAP 4/> PARM: CPT | Mod: PO

## 2024-05-13 ENCOUNTER — OFFICE VISIT (OUTPATIENT)
Dept: FAMILY MEDICINE | Facility: CLINIC | Age: 29
End: 2024-05-13
Payer: COMMERCIAL

## 2024-05-13 VITALS
OXYGEN SATURATION: 98 % | RESPIRATION RATE: 18 BRPM | SYSTOLIC BLOOD PRESSURE: 138 MMHG | TEMPERATURE: 99 F | HEIGHT: 67 IN | WEIGHT: 306.19 LBS | DIASTOLIC BLOOD PRESSURE: 83 MMHG | BODY MASS INDEX: 48.06 KG/M2 | HEART RATE: 60 BPM

## 2024-05-13 DIAGNOSIS — M54.42 ACUTE MIDLINE LOW BACK PAIN WITH LEFT-SIDED SCIATICA: Primary | ICD-10-CM

## 2024-05-13 DIAGNOSIS — Z71.3 DIETARY COUNSELING: ICD-10-CM

## 2024-05-13 PROBLEM — M54.9 ACUTE MIDLINE BACK PAIN: Status: ACTIVE | Noted: 2024-05-13

## 2024-05-13 PROCEDURE — 1160F RVW MEDS BY RX/DR IN RCRD: CPT | Mod: CPTII,,, | Performed by: FAMILY MEDICINE

## 2024-05-13 PROCEDURE — 3008F BODY MASS INDEX DOCD: CPT | Mod: CPTII,,, | Performed by: FAMILY MEDICINE

## 2024-05-13 PROCEDURE — 99214 OFFICE O/P EST MOD 30 MIN: CPT | Mod: ,,, | Performed by: FAMILY MEDICINE

## 2024-05-13 PROCEDURE — 1159F MED LIST DOCD IN RCRD: CPT | Mod: CPTII,,, | Performed by: FAMILY MEDICINE

## 2024-05-13 PROCEDURE — 3044F HG A1C LEVEL LT 7.0%: CPT | Mod: CPTII,,, | Performed by: FAMILY MEDICINE

## 2024-05-13 PROCEDURE — 3079F DIAST BP 80-89 MM HG: CPT | Mod: CPTII,,, | Performed by: FAMILY MEDICINE

## 2024-05-13 PROCEDURE — 3075F SYST BP GE 130 - 139MM HG: CPT | Mod: CPTII,,, | Performed by: FAMILY MEDICINE

## 2024-05-13 RX ORDER — TIZANIDINE 4 MG/1
TABLET ORAL
Qty: 60 TABLET | Refills: 2 | Status: SHIPPED | OUTPATIENT
Start: 2024-05-13

## 2024-05-13 NOTE — PROGRESS NOTES
Jana Mena MD MPH  [address], [place] [address]  Phone: [phone]     Subjective     Name: Ray Amaya   YOB: 1995 (28 y.o.)  MRN: [id number]  Visit Date: 5/13/2024   Chief Complaint: Back Pain (Patient c/o ongoing lower back pain from his last visit that radiates down his leg and makes his toes feel tingly. He states a 7 on the numerical pain scale today, states it was a 10 yesterday.) and Health Maintenance (Care gaps not addressed, patient ill today.//Maria Victoria Mccollum CMA)        HISTORY OF PRESENT ILLNESS:  Patient is a 27 yo male with no significant PMH. He came to the clinic for a walk in appointment with complaint of lower back pain. Patient reported that he has been complaining lower back pain for last 4 days. The pain started in the lower lumbar region as a soreness. The pain was 1-2/10 on pain scale. It was non radiating. He denied any weakness, or numbness. He denied any trauma to the back or falls.  On Saturday, his back pain got worsened. It was 5/10 on pain scale. It was radiating to his left leg. He experienced numbness and tingling sensation in the left LE. He was not able to put pressure on his limb s/s to pain. He took ibuprofen and Tizanidine for pain with mild to no relief.  He denied urinary or fecal incontinence.  On Sunday, the pain worsened and was 10/10 on pain scale.  On the day of encounter, he was not in pain but he complained of numbness in his left toes.  On examination there was no swelling, tenderness, or skin changes in the Left LE.        PAST MEDICAL HISTORY:  Significant Diagnoses - Patient  has a past medical history of Iron deficiency anemia, unspecified.  Medications - Patient is not on any long-term medications.   Allergies - Patient has No Known Allergies.  Surgeries - Patient  has a past surgical history that includes Tonsillectomy.  Family History - Patient family history includes Diabetes in his maternal grandmother;  Hypertension in his father, maternal grandmother, and mother.      SOCIAL HISTORY:  Tobacco - Patient  reports that he has been smoking cigars. He has never used smokeless tobacco.   Alcohol - Patient  reports current alcohol use.   Recreational Drugs - Patient  reports no history of drug use.       Review of Systems   Constitutional:  Negative for activity change, appetite change, chills, fatigue and fever.   HENT:  Negative for nasal congestion, ear discharge, ear pain, hearing loss, postnasal drip, rhinorrhea, sinus pressure/congestion and sore throat.    Eyes:  Negative for discharge, itching and visual disturbance.   Respiratory:  Negative for cough, choking, chest tightness, shortness of breath and wheezing.    Cardiovascular:  Negative for chest pain, palpitations, leg swelling and claudication.   Gastrointestinal:  Negative for abdominal distention, abdominal pain, constipation, diarrhea, nausea, vomiting and reflux.   Genitourinary:  Negative for difficulty urinating, dysuria, frequency, hematuria and urgency.   Musculoskeletal:  Positive for back pain, gait problem and leg pain. Negative for arthralgias, joint swelling and myalgias.   Integumentary:  Negative for rash.   Neurological:  Negative for tremors, light-headedness, numbness and headaches.          Past Medical History:   Diagnosis Date    Iron deficiency anemia, unspecified         Review of patient's allergies indicates:  No Known Allergies     Past Surgical History:   Procedure Laterality Date    TONSILLECTOMY          Family History   Problem Relation Name Age of Onset    Hypertension Mother      Hypertension Father      Diabetes Maternal Grandmother      Hypertension Maternal Grandmother         Current Outpatient Medications   Medication Instructions    ferrous sulfate 324 mg, Oral, Daily    tiZANidine (ZANAFLEX) 4 MG tablet 1 or 2 at bedtime as needed for muscle spasm        Objective     /83 (BP Location: Left arm, Patient Position:  "Sitting, BP Method: Large (Automatic))   Pulse 60   Temp 98.5 °F (36.9 °C) (Oral)   Resp 18   Ht 5' 7" (1.702 m)   Wt (!) 138.9 kg (306 lb 3.2 oz)   SpO2 98%   BMI 47.96 kg/m²     Physical Exam  Vitals and nursing note reviewed.   Constitutional:       Appearance: Normal appearance. He is normal weight.   HENT:      Head: Normocephalic and atraumatic.      Right Ear: Tympanic membrane and ear canal normal.      Left Ear: Tympanic membrane and ear canal normal.      Nose: Nose normal.      Mouth/Throat:      Mouth: Mucous membranes are moist.      Pharynx: Oropharynx is clear.   Eyes:      Extraocular Movements: Extraocular movements intact.      Conjunctiva/sclera: Conjunctivae normal.      Pupils: Pupils are equal, round, and reactive to light.   Cardiovascular:      Rate and Rhythm: Normal rate and regular rhythm.      Pulses: Normal pulses.      Heart sounds: Normal heart sounds. No murmur heard.  Pulmonary:      Effort: Pulmonary effort is normal. No respiratory distress.      Breath sounds: Normal breath sounds. No wheezing.   Abdominal:      General: Bowel sounds are normal. There is no distension.      Palpations: Abdomen is soft.      Tenderness: There is no abdominal tenderness. There is no guarding.   Musculoskeletal:         General: No tenderness or signs of injury.      Cervical back: Normal range of motion and neck supple.      Right lower leg: No edema.      Left lower leg: No edema.      Right foot: Normal.      Left foot: Normal range of motion. No foot drop, tenderness or bony tenderness. Normal pulse.   Skin:     Capillary Refill: Capillary refill takes less than 2 seconds.   Neurological:      General: No focal deficit present.      Mental Status: He is alert and oriented to person, place, and time. Mental status is at baseline.      Sensory: Sensory deficit present.      Motor: Motor function is intact.      Gait: Gait is intact.   Psychiatric:         Mood and Affect: Mood normal.        "  Behavior: Behavior normal.         Thought Content: Thought content normal.         Judgment: Judgment normal.          All recently obtained labs have been reviewed and discussed in detail with the patient.   Assessment     1. Acute midline low back pain with left-sided sciatica    2. Dietary counseling         Plan     Problem List Items Addressed This Visit          Endocrine    Dietary counseling     A Simple Weight Loss Program  Step 1  1. Eat only at mealtime.  2. Eat what you always eat, but one-fourth less.  3. Avoid snacks. Don't buy them. If you don't buy them, you can't eat them.  4. Avoid foods with high-caloric content like desserts (pie, cake, ice cream, cookies).  5. If you drink soft drinks, drink only the ones without sugar. If you don't like them, most people get used to them. Then they don't like the sugary ones (taste too sweet).  6. If you do well and lose 1-2 pounds per week, every two weeks reward yourself with your favorite dessert.  7. By doing the above you will develop good dietary habits that will stay with you for a lifetime.  Step 2  Once you have been successful with step 1 (losing 1-2 pounds per week for a month), start a physical fitness program, by walking for 30 minutes five time per week.  Step 3  Once you are successful with steps 2 and step 2, start working on eating a healthy diet (see below).    USDA Guidelines for a Healthy Diet  An eating plan that helps manage your weight includes a variety of healthy foods. Add an array of colors to your plate and think of it as eating the rainbow. Dark, leafy greens, oranges, and tomatoes even fresh herbs--are loaded with vitamins, fiber, and minerals. Adding frozen peppers, broccoli, or onions to stews and omelets gives them a quick and convenient boost of color and nutrients.    According to the Dietary Guidelines for Americans 5024-3599, a healthy eating plan:   Emphasizes fruits, vegetables, whole grains, and fat-free or low fat milk  and milk products   Includes a variety of protein foods such as seafood, lean meats and poultry, eggs, legumes (beans and peas), soy products, nuts, and seeds.   Is low in added sugars, sodium, saturated fats, trans fats, and cholesterol.   Stays within your daily calorie needs    USDA's MyPlate Plan can help you identify what and how much to eat from the different food groups while staying within your recommended calorie allowance. You can also download My Food Diary to help track your meals.    Fruit  Fresh, frozen, or canned fruits are great choices. Try fruits beyond apples and bananas such as jessie, pineapple or kiwi fruit. When fresh fruit is not in season, try a frozen, canned, or dried variety. Be aware that dried and canned fruit may contain added sugars or syrups. Choose canned varieties of fruit packed in water or in its own juice.    Vegetables  Add variety to grilled or steamed vegetables with an herb such as rosemary. You can also sauté (panfry) vegetables in a non-stick pan with a small amount of cooking spray.  Or try frozen or canned vegetables for a quick side dish just microwave and serve.  Look for canned vegetables without added salt, butter, or cream sauces. For variety, try a new vegetable each week.    Calcium-rich foods  In addition to fat-free and low fat milk, consider low-fat and fat-free yogurts without added sugars. These come in a variety of flavors and can be a great dessert substitute.  Meats  If your favorite recipe calls for frying fish or breaded chicken, try healthier variations by baking or grilling. Maybe even try dry beans in place of meats. Ask friends and search the internet and magazines for recipes with fewer calories - you might be surprised to find you have a new favorite dish.    Comfort Foods  Healthy eating is all about balance. You can enjoy your favorite foods, even if they are high in calories, fat or added sugars. The key is eating them only once in a while and  balancing them with healthier foods and more physical activity.  Some general tips for comfort foods:   Eat them less often. If you normally eat these foods every day, cut back to once a week or once a month.   Eat smaller amounts. If your favorite higher-calorie food is a chocolate bar, have a smaller size or only half a bar.   Try a lower-calorie version. Use lower-calorie ingredients or prepare food differently. For example, if your macaroni and cheese recipe includes whole milk, butter, and full-fat cheese, try remaking it with non-fat milk, less butter, low-fat cheese, fresh spinach and tomatoes. Just remember to not increase your portion size.              Orthopedic    Acute low back pain - Primary     - patient complained of Lower back pain radiating to left lower extremity with associated numbness, weakness, decreased range of movement, or radiation to the left LE.  - on examination there was no joint swelling, warmth, or redness. There was no bruising or signs of injury.  - patient was counseled to use heat on the affected area (use a barrier between the heating tool and skin)  - Referral was made to spine surgery (Dr Ryan Agosto)  - Xray of spine was ordered  - Tizanidine 4 mg PRN nightly  - follow up if symptoms don't resolve or worsen           Relevant Medications    tiZANidine (ZANAFLEX) 4 MG tablet    Other Relevant Orders    X-Ray Lumbar Spine AP And Lateral    Ambulatory referral/consult to Neurology         All orders:  Orders Placed This Encounter    X-Ray Lumbar Spine AP And Lateral    Ambulatory referral/consult to Neurology    tiZANidine (ZANAFLEX) 4 MG tablet          Follow up if symptoms worsen or fail to improve.    Jana Mena MD MPH   [place]

## 2024-05-13 NOTE — ASSESSMENT & PLAN NOTE
A Simple Weight Loss Program  Step 1  1. Eat only at mealtime.  2. Eat what you always eat, but one-fourth less.  3. Avoid snacks. Don't buy them. If you don't buy them, you can't eat them.  4. Avoid foods with high-caloric content like desserts (pie, cake, ice cream, cookies).  5. If you drink soft drinks, drink only the ones without sugar. If you don't like them, most people get used to them. Then they don't like the sugary ones (taste too sweet).  6. If you do well and lose 1-2 pounds per week, every two weeks reward yourself with your favorite dessert.  7. By doing the above you will develop good dietary habits that will stay with you for a lifetime.  Step 2  Once you have been successful with step 1 (losing 1-2 pounds per week for a month), start a physical fitness program, by walking for 30 minutes five time per week.  Step 3  Once you are successful with steps 2 and step 2, start working on eating a healthy diet (see below).    USDA Guidelines for a Healthy Diet  An eating plan that helps manage your weight includes a variety of healthy foods. Add an array of colors to your plate and think of it as eating the rainbow. Dark, leafy greens, oranges, and tomatoes even fresh herbs--are loaded with vitamins, fiber, and minerals. Adding frozen peppers, broccoli, or onions to stews and omelets gives them a quick and convenient boost of color and nutrients.    According to the Dietary Guidelines for Americans 6938-6088, a healthy eating plan:   Emphasizes fruits, vegetables, whole grains, and fat-free or low fat milk and milk products   Includes a variety of protein foods such as seafood, lean meats and poultry, eggs, legumes (beans and peas), soy products, nuts, and seeds.   Is low in added sugars, sodium, saturated fats, trans fats, and cholesterol.   Stays within your daily calorie needs    USDA's MyPlate Plan can help you identify what and how much to eat from the different food groups while staying within your  recommended calorie allowance. You can also download My Food Diary to help track your meals.    Fruit  Fresh, frozen, or canned fruits are great choices. Try fruits beyond apples and bananas such as jessie, pineapple or kiwi fruit. When fresh fruit is not in season, try a frozen, canned, or dried variety. Be aware that dried and canned fruit may contain added sugars or syrups. Choose canned varieties of fruit packed in water or in its own juice.    Vegetables  Add variety to grilled or steamed vegetables with an herb such as rosemary. You can also sauté (panfry) vegetables in a non-stick pan with a small amount of cooking spray.  Or try frozen or canned vegetables for a quick side dish just microwave and serve.  Look for canned vegetables without added salt, butter, or cream sauces. For variety, try a new vegetable each week.    Calcium-rich foods  In addition to fat-free and low fat milk, consider low-fat and fat-free yogurts without added sugars. These come in a variety of flavors and can be a great dessert substitute.  Meats  If your favorite recipe calls for frying fish or breaded chicken, try healthier variations by baking or grilling. Maybe even try dry beans in place of meats. Ask friends and search the internet and magazines for recipes with fewer calories - you might be surprised to find you have a new favorite dish.    Comfort Foods  Healthy eating is all about balance. You can enjoy your favorite foods, even if they are high in calories, fat or added sugars. The key is eating them only once in a while and balancing them with healthier foods and more physical activity.  Some general tips for comfort foods:   Eat them less often. If you normally eat these foods every day, cut back to once a week or once a month.   Eat smaller amounts. If your favorite higher-calorie food is a chocolate bar, have a smaller size or only half a bar.   Try a lower-calorie version. Use lower-calorie ingredients or prepare food  differently. For example, if your macaroni and cheese recipe includes whole milk, butter, and full-fat cheese, try remaking it with non-fat milk, less butter, low-fat cheese, fresh spinach and tomatoes. Just remember to not increase your portion size.

## 2024-05-13 NOTE — ASSESSMENT & PLAN NOTE
- patient complained of Lower back pain radiating to left lower extremity with associated numbness, weakness, decreased range of movement, or radiation to the left LE.  - on examination there was no joint swelling, warmth, or redness. There was no bruising or signs of injury.  - patient was counseled to use heat on the affected area (use a barrier between the heating tool and skin)  - Referral was made to spine surgery (Dr Ryan Agosto)  - Xray of spine was ordered  - Tizanidine 4 mg PRN nightly  - follow up if symptoms don't resolve or worsen

## 2024-05-14 DIAGNOSIS — M54.16 LUMBAR RADICULOPATHY: ICD-10-CM

## 2024-05-14 DIAGNOSIS — M54.6 ACUTE MIDLINE THORACIC BACK PAIN: Primary | ICD-10-CM

## 2024-05-20 ENCOUNTER — HOSPITAL ENCOUNTER (OUTPATIENT)
Dept: RADIOLOGY | Facility: HOSPITAL | Age: 29
Discharge: HOME | End: 2024-05-20
Attending: ORTHOPAEDIC SURGERY
Payer: COMMERCIAL

## 2024-05-20 ENCOUNTER — OFFICE VISIT (OUTPATIENT)
Dept: SPINE | Facility: CLINIC | Age: 29
End: 2024-05-20
Payer: COMMERCIAL

## 2024-05-20 DIAGNOSIS — M54.6 ACUTE MIDLINE THORACIC BACK PAIN: ICD-10-CM

## 2024-05-20 DIAGNOSIS — M54.42 ACUTE MIDLINE LOW BACK PAIN WITH LEFT-SIDED SCIATICA: ICD-10-CM

## 2024-05-20 DIAGNOSIS — M54.16 LUMBAR RADICULOPATHY: ICD-10-CM

## 2024-05-20 DIAGNOSIS — M54.16 LUMBAR RADICULOPATHY: Primary | ICD-10-CM

## 2024-05-20 PROCEDURE — 72110 X-RAY EXAM L-2 SPINE 4/>VWS: CPT | Mod: TC

## 2024-05-20 PROCEDURE — 72070 X-RAY EXAM THORAC SPINE 2VWS: CPT | Mod: 26,,, | Performed by: ORTHOPAEDIC SURGERY

## 2024-05-20 PROCEDURE — 72110 X-RAY EXAM L-2 SPINE 4/>VWS: CPT | Mod: 26,,, | Performed by: ORTHOPAEDIC SURGERY

## 2024-05-20 PROCEDURE — 99204 OFFICE O/P NEW MOD 45 MIN: CPT | Mod: S$PBB,,, | Performed by: ORTHOPAEDIC SURGERY

## 2024-05-20 PROCEDURE — 3044F HG A1C LEVEL LT 7.0%: CPT | Mod: CPTII,,, | Performed by: ORTHOPAEDIC SURGERY

## 2024-05-20 PROCEDURE — 99213 OFFICE O/P EST LOW 20 MIN: CPT | Mod: PBBFAC,25 | Performed by: ORTHOPAEDIC SURGERY

## 2024-05-20 PROCEDURE — 1159F MED LIST DOCD IN RCRD: CPT | Mod: CPTII,,, | Performed by: ORTHOPAEDIC SURGERY

## 2024-05-20 PROCEDURE — 72070 X-RAY EXAM THORAC SPINE 2VWS: CPT | Mod: TC

## 2024-05-20 RX ORDER — GABAPENTIN 300 MG/1
300 CAPSULE ORAL 3 TIMES DAILY
Qty: 90 CAPSULE | Refills: 5 | Status: SHIPPED | OUTPATIENT
Start: 2024-05-20

## 2024-05-20 NOTE — PROGRESS NOTES
AP, lateral, flexion/extension views of the lumbar spine reviewed    On the AP there is normal coronal alignment.  There are 5 non-rib-bearing lumbar vertebrae.  On the lateral there is maintained lumbar lordosis.  Mild lumbar spondylotic changes noted.  No fractures or listhesis noted.  No instability on flexion-extension views.    Impression:  Mild lumbar spondylosis

## 2024-05-20 NOTE — PROGRESS NOTES
MDM/time:  Greater than 45 minutes spent on this encounter including 15 minutes reviewing imaging and notes, 20 minutes with the patient, 10 minutes documentation    ASSESSMENT:  28 y.o. male with lumbar spondylosis with radiculopathy    PLAN:  Physical therapy lumbar spine  Neurontin 300 mg 1 tablet 3 times a day  Follow-up in 2 months    HPI:  28 y.o. male here for evaluation of lower back pain radiates into the left leg.  Patient reports he started noticing pain in his back February 2024 was seen at an urgent care clinic had x-rays and was treated with pain medication and muscle relaxers.  Patient reports after this visit he started having improvement of his pain up until week ago then started having lower back pain with worsening left leg pain.  He reports decreased walking tolerance due to weakness in the left leg.  No difficulty with  strength.  No balance issues or falls.  Denies bladder bowel incontinence.  Currently taking ibuprofen and tizanidine as needed for pain.  No recent physical therapy.  No prior pain management.  No recent MRI.  No prior spine surgery.  Patient currently smokes 1 pack a black and mild cigarettes per day.    IMAGING:  AP, lateral, flexion/extension views of the lumbar spine reviewed     On the AP there is normal coronal alignment.  There are 5 non-rib-bearing lumbar vertebrae.  On the lateral there is maintained lumbar lordosis.  Mild lumbar spondylotic changes noted.  No fractures or listhesis noted.  No instability on flexion-extension views.     Impression:  Mild lumbar spondylosis      AP and lateral views of the thoracic spine reviewed     On the AP there is normal coronal alignment.  On the lateral view there is maintained thoracic kyphosis.  No fractures or listhesis noted.       Impression:  Normal thoracic spine    Past Medical History:   Diagnosis Date    Iron deficiency anemia, unspecified      Past Surgical History:   Procedure Laterality Date    TONSILLECTOMY        Social History     Tobacco Use    Smoking status: Every Day     Types: Cigars    Smokeless tobacco: Never    Tobacco comments:     Black and Milds   Substance Use Topics    Alcohol use: Yes    Drug use: Never      Current Outpatient Medications   Medication Instructions    ferrous sulfate 324 mg, Oral, Daily    tiZANidine (ZANAFLEX) 4 MG tablet 1 or 2 at bedtime as needed for muscle spasm        EXAM:  Constitutional  General Appearance:  There is no height or weight on file to calculate BMI., NAD  Psychiatric   Orientation: Oriented to time, oriented to place, oriented to person  Mood and Affect: Active and alert, normal mood, normal affect  Gait and Station   Appearance:  Antalgic gait to the left, unable to tandem gait, unable to walk on toes, unable to walk on heels    LUMBAR  Musculoskeletal System   Hips: Normal appearance, no leg length discrepancy, normal motion; left, normal motion; right    Lumbar Spine                   Inspection:  Normal alignment, normal sagittal balance                  Range of motion:  Decreased flexion, extension, lateral bending, rotation. Pain with range of motion                  Bony Palpation of the Lumbar Spine:  No tenderness of the spinous process, no tenderness of the sacrum, no tenderness of the coccyx                  Bony Palpation of the Right Hip:  No tenderness of the iliac crest, no tenderness of the sciatic notch, no tenderness of the SI joint                  Bony Palpation of the Left Hip:  No tenderness of the iliac crest, no tenderness of the sciatic notch, no tenderness of the SI joint                  Soft Tissue Palpation on the Right:  No tenderness of the paraspinal region, no tenderness of the iliolumbar region                  Soft Tissue Palpation on the Left:  No tenderness of the paraspinal region, no tenderness of the iliolumbar region    Motor Strength   L1 Right:  Hip flexion iliopsoas 5/5    L1 Left:  Hip flexion iliopsoas 4/5               L2-L4 Right:  Knee extension quadriceps 5/5, tibialis anterior 5/5              L2-L4 Left:  Knee extension quadriceps 5/5, tibialis anterior 5/5   L5 Right:  Extensor hallucis llongus 5/5,    L5 Left:  Extensor hallucis longus 5/5,    S1 Right:  Plantar flexion gastrocnemius 5/5   S1 Left:  Plantar flexion gastrocnemius 5/5    Neurological System   Ankle Reflex Right:  normal   Ankle Reflex Left: normal   Knee Reflex Right:  normal   Knee Reflex Left:  normal   Sensation on the Right:  L2 normal, L3 normal, L4 normal, L5 normal, S1 normal   Sensation on the Left:  L2 normal, L3 normal, L4 normal, L5 normal, S1 normal              Special Test on the Right:  Seated straight leg raising test negative, no clonus of the ankle              Special Test on the Left:  Seated straight leg raising test negative, no clonus of the ankle    Skin   Lumbosacral Spine:  Normal skin    Cardiovascular System   Arterial Pulses Right:  Posterior tibialis normal, dorsalis pedis normal   Arterial Pulses Left:  Posterior tibialis normal, dorsalis pedis normal   Edema Right: None   Edema Left:  None

## 2024-05-20 NOTE — PROGRESS NOTES
AP and lateral views of the thoracic spine reviewed    On the AP there is normal coronal alignment.  On the lateral view there is maintained thoracic kyphosis.  No fractures or listhesis noted.      Impression:  Normal thoracic spine

## 2024-06-04 ENCOUNTER — OFFICE VISIT (OUTPATIENT)
Dept: FAMILY MEDICINE | Facility: CLINIC | Age: 29
End: 2024-06-04
Payer: COMMERCIAL

## 2024-06-04 VITALS
HEIGHT: 67 IN | DIASTOLIC BLOOD PRESSURE: 86 MMHG | OXYGEN SATURATION: 99 % | SYSTOLIC BLOOD PRESSURE: 132 MMHG | TEMPERATURE: 98 F | RESPIRATION RATE: 18 BRPM | WEIGHT: 306 LBS | HEART RATE: 73 BPM | BODY MASS INDEX: 48.03 KG/M2

## 2024-06-04 DIAGNOSIS — G89.29 CHRONIC RIGHT-SIDED LOW BACK PAIN WITH RIGHT-SIDED SCIATICA: Primary | ICD-10-CM

## 2024-06-04 DIAGNOSIS — M54.41 CHRONIC RIGHT-SIDED LOW BACK PAIN WITH RIGHT-SIDED SCIATICA: Primary | ICD-10-CM

## 2024-06-04 PROBLEM — M54.40 CHRONIC RIGHT-SIDED LOW BACK PAIN WITH SCIATICA: Status: ACTIVE | Noted: 2024-06-04

## 2024-06-04 PROCEDURE — 3044F HG A1C LEVEL LT 7.0%: CPT | Mod: CPTII,,, | Performed by: FAMILY MEDICINE

## 2024-06-04 PROCEDURE — 1160F RVW MEDS BY RX/DR IN RCRD: CPT | Mod: CPTII,,, | Performed by: FAMILY MEDICINE

## 2024-06-04 PROCEDURE — 3079F DIAST BP 80-89 MM HG: CPT | Mod: CPTII,,, | Performed by: FAMILY MEDICINE

## 2024-06-04 PROCEDURE — 99214 OFFICE O/P EST MOD 30 MIN: CPT | Mod: ,,, | Performed by: FAMILY MEDICINE

## 2024-06-04 PROCEDURE — 1159F MED LIST DOCD IN RCRD: CPT | Mod: CPTII,,, | Performed by: FAMILY MEDICINE

## 2024-06-04 PROCEDURE — 3008F BODY MASS INDEX DOCD: CPT | Mod: CPTII,,, | Performed by: FAMILY MEDICINE

## 2024-06-04 PROCEDURE — 3075F SYST BP GE 130 - 139MM HG: CPT | Mod: CPTII,,, | Performed by: FAMILY MEDICINE

## 2024-06-04 NOTE — ASSESSMENT & PLAN NOTE
06/04  - States that he feels better with his lower back pain   -Patient has started PT for the past few weeks with improvement   -On examination, unremarkable  -At this time, RTC in 3-months

## 2024-06-04 NOTE — PROGRESS NOTES
Suleiman Davis MD    [address], [place] [address]  Phone: [phone]     Subjective     Name: Ray Amaya   YOB: 1995 (28 y.o.)  MRN: [id number]  Visit Date: 6/4/2024   Chief Complaint: Follow-up (Patient came in today for a follow up )        HISTORY OF PRESENT ILLNESS:  Patient is a 28-year-old male with past medical history of Morbid Obesity and Lower back pain who presents the [facility] for lower back pain discomfort. Reports that his back has improved with PT over the past few weeks. States that the Zanaflex take as need for the discomfort. At this time, no concerns and will follow-up in 3-months for repeat labs.       PAST MEDICAL HISTORY:  Significant Diagnoses - Patient  has a past medical history of Back injury, Difficulty sleeping, Iron deficiency anemia, unspecified, and Poor circulation.  Medications - Patient has a current medication list which includes the following long-term medication(s): gabapentin.   Allergies - Patient has No Known Allergies.  Surgeries - Patient  has a past surgical history that includes Tonsillectomy.  Family History - Patient family history includes Diabetes in his maternal grandmother; Hypertension in his father, maternal grandmother, and mother.      SOCIAL HISTORY:  Tobacco - Patient  reports that he has been smoking cigars. He has never used smokeless tobacco.   Alcohol - Patient  reports current alcohol use.   Recreational Drugs - Patient  reports no history of drug use.       Review of Systems   All other systems reviewed and are negative.       Past Medical History:   Diagnosis Date    Back injury     Difficulty sleeping     Iron deficiency anemia, unspecified     Poor circulation         Review of patient's allergies indicates:  No Known Allergies     Past Surgical History:   Procedure Laterality Date    TONSILLECTOMY          Family History   Problem Relation Name Age of Onset    Hypertension Mother       "Hypertension Father      Diabetes Maternal Grandmother      Hypertension Maternal Grandmother         Current Outpatient Medications   Medication Instructions    ferrous sulfate 324 mg, Oral, Daily    gabapentin (NEURONTIN) 300 mg, Oral, 3 times daily    tiZANidine (ZANAFLEX) 4 MG tablet 1 or 2 at bedtime as needed for muscle spasm        Objective     /86 (BP Location: Right arm, Patient Position: Sitting, BP Method: Large (Automatic))   Pulse 73   Temp 98.2 °F (36.8 °C) (Oral)   Resp 18   Ht 5' 7" (1.702 m)   Wt (!) 138.8 kg (306 lb)   SpO2 99%   BMI 47.93 kg/m²     Physical Exam  Constitutional:       General: He is not in acute distress.     Appearance: Normal appearance. He is obese. He is not ill-appearing.   HENT:      Head: Normocephalic and atraumatic.   Cardiovascular:      Rate and Rhythm: Normal rate.      Pulses: Normal pulses.   Pulmonary:      Effort: Pulmonary effort is normal. No respiratory distress.   Abdominal:      General: There is no distension.      Palpations: There is no mass.   Musculoskeletal:         General: No swelling or tenderness. Normal range of motion.      Cervical back: Normal range of motion.   Skin:     Coloration: Skin is not jaundiced or pale.   Neurological:      Mental Status: He is alert.   Psychiatric:         Mood and Affect: Mood normal.         Behavior: Behavior normal.         Thought Content: Thought content normal.        All recently obtained labs have been reviewed and discussed in detail with the patient.   Assessment     1. Chronic right-sided low back pain with right-sided sciatica         Plan     Problem List Items Addressed This Visit          Orthopedic    Chronic right-sided low back pain with sciatica - Primary     06/04  - States that he feels better with his lower back pain   -Patient has started PT for the past few weeks with improvement   -On examination, unremarkable  -At this time, RTC in 3-months              All orders:    "       Follow up in about 3 months (around 9/2/2024).    Instructed patient that if symptoms fail to improve or worsen patient should seek immediate medical attention or report to the nearest emergency department. Patient expressed verbal agreement and understanding to this plan of care.   Suleiman Davis MD  Family Medicine Residency PGY-1    [facility]

## 2024-07-01 ENCOUNTER — OFFICE VISIT (OUTPATIENT)
Dept: FAMILY MEDICINE | Facility: CLINIC | Age: 29
End: 2024-07-01
Payer: COMMERCIAL

## 2024-07-01 VITALS
TEMPERATURE: 98 F | WEIGHT: 306 LBS | SYSTOLIC BLOOD PRESSURE: 147 MMHG | BODY MASS INDEX: 48.03 KG/M2 | HEIGHT: 67 IN | HEART RATE: 59 BPM | DIASTOLIC BLOOD PRESSURE: 97 MMHG | OXYGEN SATURATION: 99 % | RESPIRATION RATE: 19 BRPM

## 2024-07-01 DIAGNOSIS — G89.29 CHRONIC RIGHT-SIDED LOW BACK PAIN WITH RIGHT-SIDED SCIATICA: Primary | ICD-10-CM

## 2024-07-01 DIAGNOSIS — M54.41 CHRONIC RIGHT-SIDED LOW BACK PAIN WITH RIGHT-SIDED SCIATICA: Primary | ICD-10-CM

## 2024-07-01 PROCEDURE — 3075F SYST BP GE 130 - 139MM HG: CPT | Mod: CPTII,,, | Performed by: FAMILY MEDICINE

## 2024-07-01 PROCEDURE — 1159F MED LIST DOCD IN RCRD: CPT | Mod: CPTII,,, | Performed by: FAMILY MEDICINE

## 2024-07-01 PROCEDURE — 3080F DIAST BP >= 90 MM HG: CPT | Mod: CPTII,,, | Performed by: FAMILY MEDICINE

## 2024-07-01 PROCEDURE — 3044F HG A1C LEVEL LT 7.0%: CPT | Mod: CPTII,,, | Performed by: FAMILY MEDICINE

## 2024-07-01 PROCEDURE — 3008F BODY MASS INDEX DOCD: CPT | Mod: CPTII,,, | Performed by: FAMILY MEDICINE

## 2024-07-01 PROCEDURE — 99213 OFFICE O/P EST LOW 20 MIN: CPT | Mod: ,,, | Performed by: FAMILY MEDICINE

## 2024-07-01 PROCEDURE — 1160F RVW MEDS BY RX/DR IN RCRD: CPT | Mod: CPTII,,, | Performed by: FAMILY MEDICINE

## 2024-07-01 NOTE — LETTER
July 1, 2024      [facility]  [address]  [place] [address]  Phone: [phone]  Fax: [phone]       Patient: Ray Amaya   YOB: 1995  Date of Visit: 07/01/2024    To Whom It May Concern:    Sita Amaya  was at [facility] on 07/01/2024. The patient may return to work/school on 07/01/2024 with no restrictions. If you have any questions or concerns, or if I can be of further assistance, please do not hesitate to contact me.    Sincerely,    Suleiman Davis MD

## 2024-07-01 NOTE — PROGRESS NOTES
Suleiman Davis MD    [address], [place] [address]  Phone: [phone]     Subjective     Name: Ray Amaya   YOB: 1995 (28 y.o.)  MRN: [id number]  Visit Date: 7/1/2024   Chief Complaint: Follow-up (Needs paperwork to return to work )        HISTORY OF PRESENT ILLNESS:  Patient is a 28-year-old male with past medical history of Morbid Obesity and Lower back pain who presents the [facility] for lower back pain discomfort that has resolved. States that PT has helped with the discomfort and will like to return back to work.     Denied any back pain, weakness or numbness or tingling.       PAST MEDICAL HISTORY:  Significant Diagnoses - Patient  has a past medical history of Back injury, Difficulty sleeping, Iron deficiency anemia, unspecified, and Poor circulation.  Medications - Patient has a current medication list which includes the following long-term medication(s): gabapentin.   Allergies - Patient has No Known Allergies.  Surgeries - Patient  has a past surgical history that includes Tonsillectomy.  Family History - Patient family history includes Diabetes in his maternal grandmother; Hypertension in his father, maternal grandmother, and mother.      SOCIAL HISTORY:  Tobacco - Patient  reports that he has been smoking cigars. He has never used smokeless tobacco.   Alcohol - Patient  reports current alcohol use.   Recreational Drugs - Patient  reports no history of drug use.       Review of Systems   All other systems reviewed and are negative.       Past Medical History:   Diagnosis Date    Back injury     Difficulty sleeping     Iron deficiency anemia, unspecified     Poor circulation         Review of patient's allergies indicates:  No Known Allergies     Past Surgical History:   Procedure Laterality Date    TONSILLECTOMY          Family History   Problem Relation Name Age of Onset    Hypertension Mother      Hypertension Father      Diabetes Maternal  "Grandmother      Hypertension Maternal Grandmother         Current Outpatient Medications   Medication Instructions    gabapentin (NEURONTIN) 300 mg, Oral, 3 times daily    tiZANidine (ZANAFLEX) 4 MG tablet 1 or 2 at bedtime as needed for muscle spasm        Objective     BP (!) 147/97 (BP Location: Left arm, Patient Position: Sitting)   Pulse (!) 59   Temp 98.3 °F (36.8 °C) (Oral)   Resp 19   Ht 5' 7" (1.702 m)   Wt (!) 138.8 kg (306 lb)   SpO2 99%   BMI 47.93 kg/m²     Physical Exam  Vitals and nursing note reviewed.   Constitutional:       General: He is not in acute distress.     Appearance: Normal appearance. He is obese. He is not ill-appearing.   HENT:      Head: Normocephalic and atraumatic.   Cardiovascular:      Rate and Rhythm: Normal rate.      Pulses: Normal pulses.      Heart sounds: Normal heart sounds.   Pulmonary:      Effort: Pulmonary effort is normal.      Breath sounds: Normal breath sounds.   Abdominal:      General: There is no distension.   Musculoskeletal:         General: No tenderness. Normal range of motion.      Cervical back: Normal range of motion.   Neurological:      Mental Status: He is alert.        All recently obtained labs have been reviewed and discussed in detail with the patient.   Assessment     1. Chronic right-sided low back pain with right-sided sciatica         Plan     Problem List Items Addressed This Visit          Orthopedic    Chronic right-sided low back pain with sciatica - Primary     07/1  - Reports that his lower back pain has subsided since finishing PT  -Seen by Orthopedic that recommend Gabapentin along with PT  -Patient is a  and will like to return back to work  -Advised to use the gabapentin with caution with driving  - At this time patient can return back to work without restriction                 All orders:          No follow-ups on file.    Instructed patient that if symptoms fail to improve or worsen patient should seek immediate " medical attention or report to the nearest emergency department. Patient expressed verbal agreement and understanding to this plan of care.   Suleiman Davis MD  Family Medicine Residency PGY-1    [facility]

## 2024-09-09 ENCOUNTER — OFFICE VISIT (OUTPATIENT)
Dept: FAMILY MEDICINE | Facility: CLINIC | Age: 29
End: 2024-09-09
Payer: COMMERCIAL

## 2024-09-09 ENCOUNTER — OFFICE VISIT (OUTPATIENT)
Dept: SPINE | Facility: CLINIC | Age: 29
End: 2024-09-09
Payer: COMMERCIAL

## 2024-09-09 VITALS
SYSTOLIC BLOOD PRESSURE: 122 MMHG | RESPIRATION RATE: 18 BRPM | BODY MASS INDEX: 48.18 KG/M2 | HEIGHT: 67 IN | WEIGHT: 307 LBS | OXYGEN SATURATION: 98 % | DIASTOLIC BLOOD PRESSURE: 79 MMHG | TEMPERATURE: 98 F | HEART RATE: 72 BPM

## 2024-09-09 DIAGNOSIS — M54.42 ACUTE MIDLINE LOW BACK PAIN WITH LEFT-SIDED SCIATICA: ICD-10-CM

## 2024-09-09 DIAGNOSIS — M54.16 LUMBAR RADICULOPATHY: Primary | ICD-10-CM

## 2024-09-09 DIAGNOSIS — G89.29 CHRONIC LOW BACK PAIN WITHOUT SCIATICA, UNSPECIFIED BACK PAIN LATERALITY: Primary | ICD-10-CM

## 2024-09-09 DIAGNOSIS — M54.50 CHRONIC LOW BACK PAIN WITHOUT SCIATICA, UNSPECIFIED BACK PAIN LATERALITY: Primary | ICD-10-CM

## 2024-09-09 DIAGNOSIS — D50.9 MICROCYTIC ANEMIA: ICD-10-CM

## 2024-09-09 LAB
ALBUMIN SERPL BCP-MCNC: 4.1 G/DL (ref 3.5–5)
ALBUMIN/GLOB SERPL: 1.3 {RATIO}
ALP SERPL-CCNC: 54 U/L (ref 45–115)
ALT SERPL W P-5'-P-CCNC: 38 U/L (ref 16–61)
ANION GAP SERPL CALCULATED.3IONS-SCNC: 8 MMOL/L (ref 7–16)
AST SERPL W P-5'-P-CCNC: 16 U/L (ref 15–37)
BASOPHILS # BLD AUTO: 0.03 K/UL (ref 0–0.2)
BASOPHILS NFR BLD AUTO: 0.5 % (ref 0–1)
BILIRUB SERPL-MCNC: 0.8 MG/DL (ref ?–1.2)
BUN SERPL-MCNC: 9 MG/DL (ref 7–18)
BUN/CREAT SERPL: 8 (ref 6–20)
CALCIUM SERPL-MCNC: 9.8 MG/DL (ref 8.5–10.1)
CHLORIDE SERPL-SCNC: 107 MMOL/L (ref 98–107)
CO2 SERPL-SCNC: 31 MMOL/L (ref 21–32)
CREAT SERPL-MCNC: 1.17 MG/DL (ref 0.7–1.3)
DIFFERENTIAL METHOD BLD: ABNORMAL
EGFR (NO RACE VARIABLE) (RUSH/TITUS): 86 ML/MIN/1.73M2
EOSINOPHIL # BLD AUTO: 0.17 K/UL (ref 0–0.5)
EOSINOPHIL NFR BLD AUTO: 2.8 % (ref 1–4)
ERYTHROCYTE [DISTWIDTH] IN BLOOD BY AUTOMATED COUNT: 14.7 % (ref 11.5–14.5)
GLOBULIN SER-MCNC: 3.2 G/DL (ref 2–4)
GLUCOSE SERPL-MCNC: 74 MG/DL (ref 74–106)
HCT VFR BLD AUTO: 42.8 % (ref 40–54)
HGB BLD-MCNC: 13.2 G/DL (ref 13.5–18)
IMM GRANULOCYTES # BLD AUTO: 0.02 K/UL (ref 0–0.04)
IMM GRANULOCYTES NFR BLD: 0.3 % (ref 0–0.4)
LYMPHOCYTES # BLD AUTO: 2.47 K/UL (ref 1–4.8)
LYMPHOCYTES NFR BLD AUTO: 40.6 % (ref 27–41)
MCH RBC QN AUTO: 23.4 PG (ref 27–31)
MCHC RBC AUTO-ENTMCNC: 30.8 G/DL (ref 32–36)
MCV RBC AUTO: 75.9 FL (ref 80–96)
MONOCYTES # BLD AUTO: 0.56 K/UL (ref 0–0.8)
MONOCYTES NFR BLD AUTO: 9.2 % (ref 2–6)
MPC BLD CALC-MCNC: 11.7 FL (ref 9.4–12.4)
NEUTROPHILS # BLD AUTO: 2.84 K/UL (ref 1.8–7.7)
NEUTROPHILS NFR BLD AUTO: 46.6 % (ref 53–65)
NRBC # BLD AUTO: 0 X10E3/UL
NRBC, AUTO (.00): 0 %
PLATELET # BLD AUTO: 225 K/UL (ref 150–400)
POTASSIUM SERPL-SCNC: 3.9 MMOL/L (ref 3.5–5.1)
PROT SERPL-MCNC: 7.3 G/DL (ref 6.4–8.2)
RBC # BLD AUTO: 5.64 M/UL (ref 4.6–6.2)
SODIUM SERPL-SCNC: 142 MMOL/L (ref 136–145)
WBC # BLD AUTO: 6.09 K/UL (ref 4.5–11)

## 2024-09-09 PROCEDURE — 3044F HG A1C LEVEL LT 7.0%: CPT | Mod: CPTII,,, | Performed by: ORTHOPAEDIC SURGERY

## 2024-09-09 PROCEDURE — 99214 OFFICE O/P EST MOD 30 MIN: CPT | Mod: S$PBB,,, | Performed by: ORTHOPAEDIC SURGERY

## 2024-09-09 PROCEDURE — 99999 PR PBB SHADOW E&M-EST. PATIENT-LVL II: CPT | Mod: PBBFAC,,, | Performed by: ORTHOPAEDIC SURGERY

## 2024-09-09 PROCEDURE — 80053 COMPREHEN METABOLIC PANEL: CPT | Mod: ,,, | Performed by: CLINICAL MEDICAL LABORATORY

## 2024-09-09 PROCEDURE — 3044F HG A1C LEVEL LT 7.0%: CPT | Mod: CPTII,,, | Performed by: FAMILY MEDICINE

## 2024-09-09 PROCEDURE — 99213 OFFICE O/P EST LOW 20 MIN: CPT | Mod: GC,,, | Performed by: FAMILY MEDICINE

## 2024-09-09 PROCEDURE — 85025 COMPLETE CBC W/AUTO DIFF WBC: CPT | Mod: ,,, | Performed by: CLINICAL MEDICAL LABORATORY

## 2024-09-09 PROCEDURE — 3074F SYST BP LT 130 MM HG: CPT | Mod: CPTII,,, | Performed by: FAMILY MEDICINE

## 2024-09-09 PROCEDURE — 1159F MED LIST DOCD IN RCRD: CPT | Mod: CPTII,,, | Performed by: ORTHOPAEDIC SURGERY

## 2024-09-09 PROCEDURE — 1160F RVW MEDS BY RX/DR IN RCRD: CPT | Mod: CPTII,,, | Performed by: FAMILY MEDICINE

## 2024-09-09 PROCEDURE — 99212 OFFICE O/P EST SF 10 MIN: CPT | Mod: PBBFAC | Performed by: ORTHOPAEDIC SURGERY

## 2024-09-09 PROCEDURE — 3008F BODY MASS INDEX DOCD: CPT | Mod: CPTII,,, | Performed by: FAMILY MEDICINE

## 2024-09-09 PROCEDURE — 1159F MED LIST DOCD IN RCRD: CPT | Mod: CPTII,,, | Performed by: FAMILY MEDICINE

## 2024-09-09 PROCEDURE — 3078F DIAST BP <80 MM HG: CPT | Mod: CPTII,,, | Performed by: FAMILY MEDICINE

## 2024-09-09 RX ORDER — TIZANIDINE 4 MG/1
TABLET ORAL
Qty: 60 TABLET | Refills: 5 | Status: SHIPPED | OUTPATIENT
Start: 2024-09-09 | End: 2024-09-09 | Stop reason: SDUPTHER

## 2024-09-09 RX ORDER — TIZANIDINE 4 MG/1
TABLET ORAL
Qty: 60 TABLET | Refills: 1 | Status: SHIPPED | OUTPATIENT
Start: 2024-09-09

## 2024-09-09 RX ORDER — GABAPENTIN 300 MG/1
300 CAPSULE ORAL 3 TIMES DAILY
Qty: 90 CAPSULE | Refills: 5 | Status: SHIPPED | OUTPATIENT
Start: 2024-09-09

## 2024-09-09 RX ORDER — GABAPENTIN 300 MG/1
300 CAPSULE ORAL 3 TIMES DAILY
Qty: 90 CAPSULE | Refills: 5 | Status: SHIPPED | OUTPATIENT
Start: 2024-09-09 | End: 2024-09-09 | Stop reason: SDUPTHER

## 2024-09-09 NOTE — ASSESSMENT & PLAN NOTE
09/09  -Patient was recently seen by Dr. Agosto for lower back pain   - States that his back discomfort has improved  - Denied any weakness or neurological deficit   -At visit, refilled gabapentin 300 mg three times a day; -Tizanidine 4 at bedtime PRN  -  reviewed negative   - CMP pending   - RTC in 3-months

## 2024-09-09 NOTE — PROGRESS NOTES
Suleiman Davis M.D.  [address], [place] [address]  Phone: [phone]     Subjective     Name: Ray Amaya   YOB: 1995 (28 y.o.)  MRN: [id number]  Visit Date: 9/9/2024   Chief Complaint: Medication Refill (Room 5 medication refill )        HISTORY OF PRESENT ILLNESS:  Patient is a 28-year-old male with past medical history of Morbid Obesity and Lower back pain who presents the [facility] for lower back pain discomfort follow-up. Reports that his back pain is intermittent and has improved since last visit. Patient was seen by Dr. Agosto Spine Specialist today; states that he can follow-up as needed. Denied any urinary or bowel incontinence. Mentioned that he is ready to loss weight. Educated the on the tools to loss weight.     Patient will follow-up in 3-months. Pending CBC and CMP.             PAST MEDICAL HISTORY:  Significant Diagnoses - Patient  has a past medical history of Back injury, Difficulty sleeping, Iron deficiency anemia, unspecified, and Poor circulation.  Medications - Patient has a current medication list which includes the following long-term medication(s): gabapentin.   Allergies - Patient has No Known Allergies.  Surgeries - Patient  has a past surgical history that includes Tonsillectomy.  Family History - Patient family history includes Diabetes in his maternal grandmother; Hypertension in his father, maternal grandmother, and mother.      SOCIAL HISTORY:  Tobacco - Patient  reports that he has been smoking cigars. He has never used smokeless tobacco.   Alcohol - Patient  reports current alcohol use.   Recreational Drugs - Patient  reports no history of drug use.       Review of Systems   Constitutional: Negative.    Genitourinary: Negative.    Neurological: Negative.           Past Medical History:   Diagnosis Date    Back injury     Difficulty sleeping     Iron deficiency anemia, unspecified     Poor circulation         Review of patient's  "allergies indicates:  No Known Allergies     Past Surgical History:   Procedure Laterality Date    TONSILLECTOMY          Family History   Problem Relation Name Age of Onset    Hypertension Mother      Hypertension Father      Diabetes Maternal Grandmother      Hypertension Maternal Grandmother         Current Outpatient Medications   Medication Instructions    gabapentin (NEURONTIN) 300 mg, Oral, 3 times daily    tiZANidine (ZANAFLEX) 4 MG tablet 1 or 2 at bedtime as needed for muscle spasm        Objective     /79 (BP Location: Left arm, Patient Position: Sitting, BP Method: Medium (Automatic))   Pulse 72   Temp 98.4 °F (36.9 °C) (Oral)   Resp 18   Ht 5' 7" (1.702 m)   Wt (!) 139.3 kg (307 lb)   SpO2 98%   BMI 48.08 kg/m²     Physical Exam  Vitals and nursing note reviewed.   Constitutional:       General: He is not in acute distress.     Appearance: Normal appearance. He is morbidly obese. He is not ill-appearing.   HENT:      Head: Normocephalic and atraumatic.   Cardiovascular:      Rate and Rhythm: Normal rate.      Heart sounds: No murmur heard.     No friction rub. No gallop.   Pulmonary:      Effort: No respiratory distress.      Breath sounds: No stridor. No wheezing or rhonchi.   Musculoskeletal:         General: No tenderness. Normal range of motion.      Right lower leg: No edema.      Left lower leg: No edema.   Neurological:      Mental Status: He is alert.      Motor: No weakness or atrophy.      Gait: Gait is intact.      Deep Tendon Reflexes: Reflexes are normal and symmetric.          All recently obtained labs have been reviewed and discussed in detail with the patient.   Assessment     1. Chronic low back pain without sciatica, unspecified back pain laterality    2. Microcytic anemia    3. Acute midline low back pain with left-sided sciatica         Plan     Problem List Items Addressed This Visit          Oncology    Microcytic anemia     09/9  - Pending CBC  -History of microcytic " anemia           Relevant Orders    CBC Auto Differential       Orthopedic    Acute low back pain    Relevant Medications    tiZANidine (ZANAFLEX) 4 MG tablet    Chronic low back pain without sciatica - Primary     09/09  -Patient was recently seen by Dr. Agosto for lower back pain   - States that his back discomfort has improved  - Denied any weakness or neurological deficit   -At visit, refilled gabapentin 300 mg three times a day; -Tizanidine 4 at bedtime PRN  -  reviewed negative   - CMP pending   - RTC in 3-months                     Relevant Medications    gabapentin (NEURONTIN) 300 MG capsule    Other Relevant Orders    Comprehensive Metabolic Panel         All orders:  Orders Placed This Encounter    CBC Auto Differential    Comprehensive Metabolic Panel    CBC with Differential    gabapentin (NEURONTIN) 300 MG capsule    tiZANidine (ZANAFLEX) 4 MG tablet          Follow up in about 3 months (around 12/9/2024).    Instructed patient that if symptoms fail to improve or worsen patient should seek immediate medical attention or report to the nearest emergency department. Patient expressed verbal agreement and understanding to this plan of care.   Suleiman Davis MD  Family Medicine Residency PGY-2  [facility]

## 2024-09-09 NOTE — PROGRESS NOTES
MDM/time:  30-35 minutes spent on this encounter including 10 minutes reviewing imaging and notes, 15 minutes with the patient, 5 minutes documentation    ASSESSMENT:  28 y.o. male with lumbar spondylosis with radiculopathy    PLAN:  Follow-up as needed    HPI:  28 y.o. male here for repeat evaluation of lower back pain radiates into the left leg.  Reports pain has significantly improved.  He completed physical therapy and has been doing his home exercise program.  Denies any new injuries.  Patient reports he started noticing pain in his back February 2024 was seen at an urgent care clinic had x-rays and was treated with pain medication and muscle relaxers.  Patient reports after this visit he started having improvement of his pain up until week ago then started having lower back pain with worsening left leg pain.  He reports decreased walking tolerance due to weakness in the left leg.  No difficulty with  strength.  No balance issues or falls.  Denies bladder bowel incontinence.  Currently taking ibuprofen and tizanidine as needed for pain.  No prior pain management.  No recent MRI.  No prior spine surgery.  Patient currently smokes 1 pack a black and mild cigarettes per day.    IMAGING:  X-rays lumbar spine reviewed show:  On the AP there is normal coronal alignment.  There are 5 non-rib-bearing lumbar vertebrae.  On the lateral there is maintained lumbar lordosis.  Mild lumbar spondylotic changes noted.  No fractures or listhesis noted.  No instability on flexion-extension views.     X-rays thoracic spine reviewed show:  On the AP there is normal coronal alignment.  On the lateral view there is maintained thoracic kyphosis.  No fractures or listhesis noted.       Past Medical History:   Diagnosis Date    Back injury     Difficulty sleeping     Iron deficiency anemia, unspecified     Poor circulation      Past Surgical History:   Procedure Laterality Date    TONSILLECTOMY       Social History     Tobacco  Use    Smoking status: Every Day     Types: Cigars    Smokeless tobacco: Never    Tobacco comments:     Black and Milds   Substance Use Topics    Alcohol use: Yes    Drug use: Never      Current Outpatient Medications   Medication Instructions    gabapentin (NEURONTIN) 300 mg, Oral, 3 times daily    tiZANidine (ZANAFLEX) 4 MG tablet 1 or 2 at bedtime as needed for muscle spasm        EXAM:  Constitutional  General Appearance:  There is no height or weight on file to calculate BMI., NAD  Psychiatric   Orientation: Oriented to time, oriented to place, oriented to person  Mood and Affect: Active and alert, normal mood, normal affect  Gait and Station   Appearance:  Antalgic gait to the left, unable to tandem gait, unable to walk on toes, unable to walk on heels    LUMBAR  Musculoskeletal System   Hips: Normal appearance, no leg length discrepancy, normal motion; left, normal motion; right    Lumbar Spine                   Inspection:  Normal alignment, normal sagittal balance                  Range of motion:  Decreased flexion, extension, lateral bending, rotation. Pain with range of motion                  Bony Palpation of the Lumbar Spine:  No tenderness of the spinous process, no tenderness of the sacrum, no tenderness of the coccyx                  Bony Palpation of the Right Hip:  No tenderness of the iliac crest, no tenderness of the sciatic notch, no tenderness of the SI joint                  Bony Palpation of the Left Hip:  No tenderness of the iliac crest, no tenderness of the sciatic notch, no tenderness of the SI joint                  Soft Tissue Palpation on the Right:  No tenderness of the paraspinal region, no tenderness of the iliolumbar region                  Soft Tissue Palpation on the Left:  No tenderness of the paraspinal region, no tenderness of the iliolumbar region    Motor Strength   L1 Right:  Hip flexion iliopsoas 5/5    L1 Left:  Hip flexion iliopsoas 4/5              L2-L4 Right:  Knee  extension quadriceps 5/5, tibialis anterior 5/5              L2-L4 Left:  Knee extension quadriceps 5/5, tibialis anterior 5/5   L5 Right:  Extensor hallucis llongus 5/5,    L5 Left:  Extensor hallucis longus 5/5,    S1 Right:  Plantar flexion gastrocnemius 5/5   S1 Left:  Plantar flexion gastrocnemius 5/5    Neurological System   Ankle Reflex Right:  normal   Ankle Reflex Left: normal   Knee Reflex Right:  normal   Knee Reflex Left:  normal   Sensation on the Right:  L2 normal, L3 normal, L4 normal, L5 normal, S1 normal   Sensation on the Left:  L2 normal, L3 normal, L4 normal, L5 normal, S1 normal              Special Test on the Right:  Seated straight leg raising test negative, no clonus of the ankle              Special Test on the Left:  Seated straight leg raising test negative, no clonus of the ankle    Skin   Lumbosacral Spine:  Normal skin    Cardiovascular System   Arterial Pulses Right:  Posterior tibialis normal, dorsalis pedis normal   Arterial Pulses Left:  Posterior tibialis normal, dorsalis pedis normal   Edema Right: None   Edema Left:  None

## 2024-09-26 ENCOUNTER — CLINICAL SUPPORT (OUTPATIENT)
Dept: PRIMARY CARE CLINIC | Facility: CLINIC | Age: 29
End: 2024-09-26

## 2024-09-26 DIAGNOSIS — Z01.10 ENCOUNTER FOR HEARING EXAMINATION, UNSPECIFIED WHETHER ABNORMAL FINDINGS: ICD-10-CM

## 2024-09-26 DIAGNOSIS — Z01.00 ENCOUNTER FOR VISION SCREENING: Primary | ICD-10-CM

## 2024-09-26 DIAGNOSIS — Z02.83 ENCOUNTER FOR DRUG SCREENING: ICD-10-CM

## 2024-09-26 DIAGNOSIS — Z02.89 ENCOUNTER FOR PHYSICAL EXAMINATION RELATED TO EMPLOYMENT: ICD-10-CM

## 2024-09-26 NOTE — PROGRESS NOTES
Subjective     Patient ID: Ray Amaya is a 28 y.o. male.    Chief Complaint: No chief complaint on file.    HPI  Review of Systems       Objective     Physical Exam       Assessment and Plan     1. Encounter for vision screening    2. Encounter for hearing examination, unspecified whether abnormal findings    3. Encounter for physical examination related to employment    4. Encounter for drug screening        See scanned documents in .            No follow-ups on file.

## 2024-10-29 ENCOUNTER — CLINICAL SUPPORT (OUTPATIENT)
Dept: PRIMARY CARE CLINIC | Facility: CLINIC | Age: 29
End: 2024-10-29

## 2024-10-29 DIAGNOSIS — Z02.4 ENCOUNTER FOR DEPARTMENT OF TRANSPORTATION (DOT) EXAMINATION FOR DRIVING LICENSE RENEWAL: Primary | ICD-10-CM

## 2024-10-29 PROCEDURE — 99499 UNLISTED E&M SERVICE: CPT | Mod: ,,, | Performed by: NURSE PRACTITIONER

## 2024-10-30 ENCOUNTER — PATIENT MESSAGE (OUTPATIENT)
Dept: RESEARCH | Facility: HOSPITAL | Age: 29
End: 2024-10-30

## 2024-11-11 ENCOUNTER — OFFICE VISIT (OUTPATIENT)
Dept: FAMILY MEDICINE | Facility: CLINIC | Age: 29
End: 2024-11-11

## 2024-11-11 VITALS
BODY MASS INDEX: 49.44 KG/M2 | RESPIRATION RATE: 19 BRPM | TEMPERATURE: 98 F | HEART RATE: 76 BPM | DIASTOLIC BLOOD PRESSURE: 90 MMHG | OXYGEN SATURATION: 98 % | HEIGHT: 67 IN | WEIGHT: 315 LBS | SYSTOLIC BLOOD PRESSURE: 138 MMHG

## 2024-11-11 DIAGNOSIS — D50.8 IRON DEFICIENCY ANEMIA SECONDARY TO INADEQUATE DIETARY IRON INTAKE: ICD-10-CM

## 2024-11-11 DIAGNOSIS — R31.29 MICROHEMATURIA: Primary | ICD-10-CM

## 2024-11-11 DIAGNOSIS — E78.1 HYPERTRIGLYCERIDEMIA: ICD-10-CM

## 2024-11-11 DIAGNOSIS — R63.5 WEIGHT GAIN: ICD-10-CM

## 2024-11-11 DIAGNOSIS — K59.00 CONSTIPATION, UNSPECIFIED CONSTIPATION TYPE: ICD-10-CM

## 2024-11-11 DIAGNOSIS — Z13.1 SCREENING FOR DIABETES MELLITUS: ICD-10-CM

## 2024-11-11 DIAGNOSIS — I10 HYPERTENSION, UNSPECIFIED TYPE: ICD-10-CM

## 2024-11-11 PROBLEM — R31.9 HEMATURIA: Status: ACTIVE | Noted: 2024-11-11

## 2024-11-11 LAB
BILIRUB UR QL STRIP: NEGATIVE
CHOLEST SERPL-MCNC: 192 MG/DL (ref 0–200)
CHOLEST/HDLC SERPL: 4 {RATIO}
CLARITY UR: CLEAR
COLOR UR: ABNORMAL
EST. AVERAGE GLUCOSE BLD GHB EST-MCNC: 100 MG/DL
FERRITIN SERPL-MCNC: 78 NG/ML (ref 26–388)
GLUCOSE UR STRIP-MCNC: NORMAL MG/DL
HBA1C MFR BLD HPLC: 5.1 % (ref 4.5–6.6)
HDLC SERPL-MCNC: 48 MG/DL (ref 40–60)
IRON SATN MFR SERPL: 22 % (ref 14–50)
IRON SERPL-MCNC: 65 ΜG/DL (ref 65–175)
KETONES UR STRIP-SCNC: NEGATIVE MG/DL
LDLC SERPL CALC-MCNC: 116 MG/DL
LDLC/HDLC SERPL: 2.4 {RATIO}
LEUKOCYTE ESTERASE UR QL STRIP: NEGATIVE
MUCOUS, UA: ABNORMAL /LPF
NITRITE UR QL STRIP: NEGATIVE
NONHDLC SERPL-MCNC: 144 MG/DL
PH UR STRIP: 6 PH UNITS
PROT UR QL STRIP: 20
RBC # UR STRIP: ABNORMAL /UL
RBC #/AREA URNS HPF: 2 /HPF
SP GR UR STRIP: 1.02
TIBC SERPL-MCNC: 302 ΜG/DL (ref 250–450)
TRIGL SERPL-MCNC: 141 MG/DL (ref 35–150)
TSH SERPL DL<=0.005 MIU/L-ACNC: 1.32 UIU/ML (ref 0.36–3.74)
UROBILINOGEN UR STRIP-ACNC: NORMAL MG/DL
VLDLC SERPL-MCNC: 28 MG/DL
WBC #/AREA URNS HPF: <1 /HPF

## 2024-11-11 PROCEDURE — 82728 ASSAY OF FERRITIN: CPT | Mod: ,,, | Performed by: CLINICAL MEDICAL LABORATORY

## 2024-11-11 PROCEDURE — 80061 LIPID PANEL: CPT | Mod: ,,, | Performed by: CLINICAL MEDICAL LABORATORY

## 2024-11-11 PROCEDURE — 83550 IRON BINDING TEST: CPT | Mod: ,,, | Performed by: CLINICAL MEDICAL LABORATORY

## 2024-11-11 PROCEDURE — 83540 ASSAY OF IRON: CPT | Mod: ,,, | Performed by: CLINICAL MEDICAL LABORATORY

## 2024-11-11 PROCEDURE — 84443 ASSAY THYROID STIM HORMONE: CPT | Mod: ,,, | Performed by: CLINICAL MEDICAL LABORATORY

## 2024-11-11 PROCEDURE — 83036 HEMOGLOBIN GLYCOSYLATED A1C: CPT | Mod: ,,, | Performed by: CLINICAL MEDICAL LABORATORY

## 2024-11-11 PROCEDURE — 81001 URINALYSIS AUTO W/SCOPE: CPT | Mod: ,,, | Performed by: CLINICAL MEDICAL LABORATORY

## 2024-11-11 RX ORDER — LISINOPRIL 5 MG/1
10 TABLET ORAL DAILY
Qty: 60 TABLET | Refills: 0 | Status: SHIPPED | OUTPATIENT
Start: 2024-11-11 | End: 2024-12-11

## 2024-11-11 RX ORDER — POLYETHYLENE GLYCOL 3350 17 G/17G
17 POWDER, FOR SOLUTION ORAL DAILY
Qty: 510 G | Refills: 0 | Status: SHIPPED | OUTPATIENT
Start: 2024-11-11 | End: 2024-12-11

## 2024-11-11 NOTE — ASSESSMENT & PLAN NOTE
11/11  - Patient had a physical examination for his job;  -UA at that time disclosed microhematuria  -Denied any red blood in urine or UTI symptoms  -Ordered UA today showed microhematuria and protein   -Referral placed to urology for possible cystoscopy   - Continue to monitor

## 2024-11-11 NOTE — PROGRESS NOTES
Suleiman Davis M.D.  [address], [place] [address]  Phone: [phone]     Subjective     Name: Ray Amaya   YOB: 1995 (28 y.o.)  MRN: [id number]  Visit Date: 11/11/2024   Chief Complaint: Hematuria (X13 days /Occasional headaches rates it a 2/10./)        HISTORY OF PRESENT ILLNESS:  Patient is a 28-year-old male with a past medical history of Morbid Obesity, Hypertriglyceridemia, HTN and Back Pain who presents to the Clinic for abnormal lab results. Patient reports that he was getting his yearly physical examination for work and the provider noticed blood in urine. Patient states that he does not have any pain. States that he feels well. At this time, ordered basic labs. RTC in one week to address HTN.       PAST MEDICAL HISTORY:  Significant Diagnoses - Patient  has a past medical history of Back injury, Difficulty sleeping, Iron deficiency anemia, unspecified, and Poor circulation.  Medications - Patient has a current medication list which includes the following long-term medication(s): gabapentin and lisinopril.   Allergies - Patient has No Known Allergies.  Surgeries - Patient  has a past surgical history that includes Tonsillectomy.  Family History - Patient family history includes Diabetes in his maternal grandmother; Hypertension in his father, maternal grandmother, and mother.      SOCIAL HISTORY:  Tobacco - Patient  reports that he has been smoking cigarettes and cigars. He has never used smokeless tobacco.   Alcohol - Patient  reports current alcohol use.   Recreational Drugs - Patient  reports no history of drug use.       Review of Systems   All other systems reviewed and are negative.       Past Medical History:   Diagnosis Date    Back injury     Difficulty sleeping     Iron deficiency anemia, unspecified     Poor circulation         Review of patient's allergies indicates:  No Known Allergies     Past Surgical History:   Procedure Laterality Date     "TONSILLECTOMY          Family History   Problem Relation Name Age of Onset    Hypertension Mother      Hypertension Father      Diabetes Maternal Grandmother      Hypertension Maternal Grandmother         Current Outpatient Medications   Medication Instructions    gabapentin (NEURONTIN) 300 mg, Oral, 3 times daily    lisinopriL (PRINIVIL,ZESTRIL) 10 mg, Oral, Daily    polyethylene glycol (MIRALAX) 17 g, Oral, Daily    tiZANidine (ZANAFLEX) 4 MG tablet 1 or 2 at bedtime as needed for muscle spasm        Objective     BP (!) 138/90 (BP Location: Left arm, Patient Position: Sitting)   Pulse 76   Temp 97.8 °F (36.6 °C) (Oral)   Resp 19   Ht 5' 7" (1.702 m)   Wt (!) 142.9 kg (315 lb)   SpO2 98%   BMI 49.34 kg/m²     Physical Exam  Vitals and nursing note reviewed.   Constitutional:       Appearance: Normal appearance.   HENT:      Head: Normocephalic and atraumatic.   Cardiovascular:      Rate and Rhythm: Normal rate.      Heart sounds: No murmur heard.     No friction rub. No gallop.   Pulmonary:      Effort: No respiratory distress.      Breath sounds: No wheezing or rhonchi.   Neurological:      Mental Status: He is alert.        All recently obtained labs have been reviewed and discussed in detail with the patient.   Assessment     1. Microhematuria    2. Hypertension, unspecified type    3. Hypertriglyceridemia    4. Iron deficiency anemia secondary to inadequate dietary iron intake    5. Constipation, unspecified constipation type    6. Screening for diabetes mellitus    7. Weight gain         Plan     Problem List Items Addressed This Visit          Cardiac/Vascular    Hypertriglyceridemia     11/11  - Repeat lipid panel  -In 03/2024 elevated triglycerides  -If elevated will recommend Omega 3 pills with diet and exercise modifications          Relevant Orders    Lipid Panel    Hypertension     11/11  - Blood pressure elevated at visit; repeat from 150/99----->138/90; history of elevated BP in the " past  - Started on Lisinopril 10 mg daily for 30-days; RTC in one week to re-evaluate  - UA disclosed protein   - Continue to monitor           Relevant Medications    lisinopriL (PRINIVIL,ZESTRIL) 5 MG tablet       Renal/    Microhematuria - Primary     11/11  - Patient had a physical examination for his job;  -UA at that time disclosed microhematuria  -Denied any red blood in urine or UTI symptoms  -Ordered UA today showed microhematuria and protein   -Referral placed to urology for possible cystoscopy   - Continue to monitor          Relevant Orders    Urinalysis, Reflex to Urine Culture (Completed)    Urinalysis, Microscopic (Completed)    Ambulatory referral/consult to Urology       Oncology    Iron deficiency anemia secondary to inadequate dietary iron intake     11/11  - Last lab in 09/2024 MCV showed 75; RDW slightly elevated  -States he stopped taking iron supplements due to constipation  - Given labs advised to continue supplements  -Started on Miralax to help with constipation  -Ordered iron studies   - Continue to monitor          Relevant Orders    Ferritin    Iron and TIBC       Endocrine    Screening for diabetes mellitus     11/11  - A1c pending         Relevant Orders    Hemoglobin A1C    Weight gain     11/11  - Weight gain over the past two months  -Ordered TSH            Relevant Orders    TSH       GI    Constipation     11/11  - Started on Miralax         Relevant Medications    polyethylene glycol (MIRALAX) 17 gram/dose powder         All orders:  Orders Placed This Encounter    Urinalysis, Reflex to Urine Culture    Ferritin    Iron and TIBC    TSH    Hemoglobin A1C    Lipid Panel    Urinalysis, Microscopic    Ambulatory referral/consult to Urology    polyethylene glycol (MIRALAX) 17 gram/dose powder    lisinopriL (PRINIVIL,ZESTRIL) 5 MG tablet          Follow up in about 1 week (around 11/18/2024).    Instructed patient that if symptoms fail to improve or worsen  patient should seek immediate medical attention or report to the nearest emergency department. Patient expressed verbal agreement and understanding to this plan of care.   Suleiman Davis MD  Family Medicine Residency PGY-2  [facility]

## 2024-11-11 NOTE — ASSESSMENT & PLAN NOTE
11/11  - Last lab in 09/2024 MCV showed 75; RDW slightly elevated  -States he stopped taking iron supplements due to constipation  - Given labs advised to continue supplements  -Started on Miralax to help with constipation  -Ordered iron studies   - Continue to monitor

## 2024-11-11 NOTE — ASSESSMENT & PLAN NOTE
11/11  - Repeat lipid panel  -In 03/2024 elevated triglycerides  -If elevated will recommend Omega 3 pills with diet and exercise modifications

## 2024-11-11 NOTE — ASSESSMENT & PLAN NOTE
11/11  - Blood pressure elevated at visit; repeat from 150/99----->138/90; history of elevated BP in the past  - Started on Lisinopril 10 mg daily for 30-days; RTC in one week to re-evaluate  - UA disclosed protein   - Continue to monitor

## 2024-11-13 ENCOUNTER — PATIENT MESSAGE (OUTPATIENT)
Dept: RESEARCH | Facility: HOSPITAL | Age: 29
End: 2024-11-13

## 2024-11-18 ENCOUNTER — OFFICE VISIT (OUTPATIENT)
Dept: FAMILY MEDICINE | Facility: CLINIC | Age: 29
End: 2024-11-18

## 2024-11-18 VITALS
DIASTOLIC BLOOD PRESSURE: 88 MMHG | OXYGEN SATURATION: 97 % | BODY MASS INDEX: 49.44 KG/M2 | RESPIRATION RATE: 18 BRPM | TEMPERATURE: 98 F | HEART RATE: 63 BPM | HEIGHT: 67 IN | SYSTOLIC BLOOD PRESSURE: 137 MMHG | WEIGHT: 315 LBS

## 2024-11-18 DIAGNOSIS — I10 HYPERTENSION, UNSPECIFIED TYPE: ICD-10-CM

## 2024-11-18 PROCEDURE — 99213 OFFICE O/P EST LOW 20 MIN: CPT | Mod: GC,,, | Performed by: FAMILY MEDICINE

## 2024-11-18 NOTE — PROGRESS NOTES
Subjective:       Patient ID: Ray Amaya is a 28 y.o. male.    Chief Complaint: Follow-up (Room 4 1 week f/u re blood in urine, appt with urologist tomorrow)    Patient is a 28-year-old male with a past medical history of Morbid Obesity, Hypertriglyceridemia, HTN and Back Pain who presents to the Clinic for HTN follow-up. Patient reports that he is not taking his anti-hypertensive agent daily. States that when time to take it he forgets. States he will start to take in the mornings. RTC in 3-months.       Current Outpatient Medications:     gabapentin (NEURONTIN) 300 MG capsule, Take 1 capsule (300 mg total) by mouth 3 (three) times daily., Disp: 90 capsule, Rfl: 5    tiZANidine (ZANAFLEX) 4 MG tablet, 1 or 2 at bedtime as needed for muscle spasm, Disp: 60 tablet, Rfl: 1    lisinopriL 10 MG Tab, Take 1 tablet (10 mg total) by mouth once daily., Disp: 60 tablet, Rfl: 0    Review of patient's allergies indicates:  No Known Allergies    Past Medical History:   Diagnosis Date    Back injury     Difficulty sleeping     Iron deficiency anemia, unspecified     Poor circulation        Past Surgical History:   Procedure Laterality Date    TONSILLECTOMY         Family History   Problem Relation Name Age of Onset    Hypertension Mother      Hypertension Father      Diabetes Maternal Grandmother      Hypertension Maternal Grandmother         Social History     Tobacco Use    Smoking status: Every Day     Types: Cigarettes, Cigars    Smokeless tobacco: Never    Tobacco comments:     Black and Lesa   Substance Use Topics    Alcohol use: Yes    Drug use: Never       Review of Systems   All other systems reviewed and are negative.      Current Medications:   Medication List with Changes/Refills   Current Medications    GABAPENTIN (NEURONTIN) 300 MG CAPSULE    Take 1 capsule (300 mg total) by mouth 3 (three) times daily.       Start Date: 9/9/2024  End Date: --    TIZANIDINE (ZANAFLEX) 4 MG TABLET     "1 or 2 at bedtime as needed for muscle spasm       Start Date: 9/9/2024  End Date: --   Changed and/or Refilled Medications    Modified Medication Previous Medication    LISINOPRIL 10 MG TAB lisinopriL (PRINIVIL,ZESTRIL) 5 MG tablet       Take 1 tablet (10 mg total) by mouth once daily.    Take 2 tablets (10 mg total) by mouth once daily.       Start Date: 11/18/2024End Date: 1/17/2025    Start Date: 11/11/2024End Date: 11/18/2024   Discontinued Medications    POLYETHYLENE GLYCOL (MIRALAX) 17 GRAM/DOSE POWDER    Take 17 g by mouth once daily.       Start Date: 11/11/2024End Date: 11/18/2024            Objective:        Vitals:    11/18/24 1436   BP: 137/88   BP Location: Left arm   Patient Position: Sitting   Pulse: 63   Resp: 18   Temp: 98.2 °F (36.8 °C)   TempSrc: Oral   SpO2: 97%   Weight: (!) 143.2 kg (315 lb 9.6 oz)   Height: 5' 7" (1.702 m)       Physical Exam  Vitals and nursing note reviewed.   Constitutional:       General: He is not in acute distress.     Appearance: Normal appearance. He is morbidly obese. He is not ill-appearing.   HENT:      Head: Normocephalic and atraumatic.   Cardiovascular:      Heart sounds: No murmur heard.     No friction rub. No gallop.   Pulmonary:      Effort: No respiratory distress.      Breath sounds: No wheezing or rhonchi.   Musculoskeletal:         General: No swelling or tenderness.      Right lower leg: No edema.      Left lower leg: No edema.   Neurological:      Mental Status: He is alert.           Lab Results   Component Value Date    WBC 6.09 09/09/2024    HGB 13.2 (L) 09/09/2024    HCT 42.8 09/09/2024     09/09/2024    CHOL 192 11/11/2024    TRIG 141 11/11/2024    HDL 48 11/11/2024    ALT 38 09/09/2024    AST 16 09/09/2024     09/09/2024    K 3.9 09/09/2024     09/09/2024    CREATININE 1.17 09/09/2024    BUN 9 09/09/2024    CO2 31 09/09/2024    TSH 1.320 11/11/2024    HGBA1C 5.1 11/11/2024      Assessment:       1. Hypertension, unspecified " type        Plan:         Problem List Items Addressed This Visit          Cardiac/Vascular    Hypertension     11/18  - Blood pressure at visit 137/88  - States not taking BP meds every day  -Encouraged to take BP med daily  -Given BP log at visit   -Will continue current management   -RTC in 3-months          Relevant Medications    lisinopriL 10 MG Tab         No follow-ups on file.    Suleiman Davis MD     Instructed patient that if symptoms fail to improve or worsen patient should seek immediate medical attention or report to the nearest emergency department. Patient expressed verbal agreement and understanding to this plan of care.

## 2024-11-18 NOTE — ASSESSMENT & PLAN NOTE
11/18  - Blood pressure at visit 137/88  - States not taking BP meds every day  -Encouraged to take BP med daily  -Given BP log at visit   -Will continue current management   -RTC in 3-months

## 2024-11-21 ENCOUNTER — OFFICE VISIT (OUTPATIENT)
Dept: UROLOGY | Facility: CLINIC | Age: 29
End: 2024-11-21

## 2024-11-21 VITALS
RESPIRATION RATE: 18 BRPM | DIASTOLIC BLOOD PRESSURE: 90 MMHG | SYSTOLIC BLOOD PRESSURE: 120 MMHG | HEART RATE: 65 BPM | OXYGEN SATURATION: 97 % | WEIGHT: 315 LBS | TEMPERATURE: 99 F | HEIGHT: 67 IN | BODY MASS INDEX: 49.44 KG/M2

## 2024-11-21 DIAGNOSIS — R31.29 MICROSCOPIC HEMATURIA: Primary | ICD-10-CM

## 2024-11-21 DIAGNOSIS — R35.1 NOCTURIA: ICD-10-CM

## 2024-11-21 DIAGNOSIS — R35.0 FREQUENCY OF URINATION: ICD-10-CM

## 2024-11-21 PROCEDURE — 99215 OFFICE O/P EST HI 40 MIN: CPT | Mod: PBBFAC | Performed by: NURSE PRACTITIONER

## 2024-11-21 PROCEDURE — 99999 PR PBB SHADOW E&M-EST. PATIENT-LVL V: CPT | Mod: PBBFAC,,, | Performed by: NURSE PRACTITIONER

## 2024-11-21 PROCEDURE — 87086 URINE CULTURE/COLONY COUNT: CPT | Mod: ,,, | Performed by: CLINICAL MEDICAL LABORATORY

## 2024-11-21 PROCEDURE — 99203 OFFICE O/P NEW LOW 30 MIN: CPT | Mod: S$PBB,,, | Performed by: NURSE PRACTITIONER

## 2024-11-21 NOTE — PATIENT INSTRUCTIONS
1. Urine culture  2. CT abdomen and pelvis with and without IV contrast to evaluate hematuria  3. Schedule cystoscopy with Dr. Chris Moore 1 week after CT results  4. Further recommendations after the hematuria workup is complete

## 2024-11-22 NOTE — PROGRESS NOTES
Subjective     Patient ID: Ray Amaya is a 28 y.o. male.    Chief Complaint: Initial Visit (New pt here for blood in urine)    This pleasant 28-year-old male presents to the clinic as a new patient referral from Dr. SOURAV Davis for microscopic hematuria.  Patient states he is doing well today.  Patient reports he is a smoker.  He reports being told he has blood in his urine on UA done November 11, 2024.  His urine dip today showed a pH of 6, specific gravity of 1.010, trace of hematuria, negative leukocytes, trace of protein.  He does report some frequency and nocturia 1 time a night.  He denies visible blood in the urine.  He denies dysuria, incomplete bladder emptying, intermittency, urgency, weak stream, or straining to urinate.  He denies fever, chills, nausea, or vomiting.  He denies any  pains.  I discussed the risks of bladder cancer with the patient in the setting of hematuria and smoking.  We further discussed that other things may cause blood in the urine such as kidney stones and infection.  I discussed doing the hematuria workup with CT of the abdomen and pelvis with and without IV contrast followed by cystoscopy if indicated.  Patient reports he is a self-pay and does not have insurance.  I discussed with the patient trying to get patient assistance to help him with these cost.  Through shared decision-making with the patient, he desires to have the hematuria workup.  He will pursue patient assistance to help him pay for the workup.  We will culture his urine and treat if indicated.  I discussed the plan in detail with the patient and he is in agreement with the plan.  All his questions were answered at today's visit.  I spent 30 minutes counseling this patient, reviewing the charts and labs.    ------------------------------------------------------------------------------------------------------------------------------------------  [November 21, 2024]        Review of Systems    Constitutional:  Negative for activity change and fever.   HENT:  Negative for hearing loss and trouble swallowing.    Eyes:  Negative for visual disturbance.   Respiratory:  Negative for cough, shortness of breath and wheezing.    Cardiovascular:  Negative for chest pain.   Gastrointestinal:  Negative for abdominal pain, diarrhea, nausea and vomiting.   Endocrine: Negative for polyuria.   Genitourinary:  Positive for frequency and hematuria. Negative for bladder incontinence, decreased urine volume, difficulty urinating, discharge, dysuria, enuresis, erectile dysfunction, flank pain, genital sores, penile pain, penile swelling, scrotal swelling, testicular pain and urgency.        Nocturia   Musculoskeletal:  Negative for back pain and gait problem.   Integumentary:  Negative for rash.   Neurological:  Negative for speech difficulty and weakness.   Psychiatric/Behavioral:  Negative for behavioral problems and confusion.           Objective     Physical Exam  Vitals and nursing note reviewed.   Constitutional:       General: He is not in acute distress.     Appearance: Normal appearance. He is not ill-appearing, toxic-appearing or diaphoretic.   HENT:      Head: Normocephalic.   Eyes:      Extraocular Movements: Extraocular movements intact.   Cardiovascular:      Rate and Rhythm: Normal rate and regular rhythm.      Heart sounds: Normal heart sounds.   Pulmonary:      Effort: Pulmonary effort is normal. No respiratory distress.      Breath sounds: Normal breath sounds. No wheezing, rhonchi or rales.   Abdominal:      General: Bowel sounds are normal.      Palpations: Abdomen is soft.      Tenderness: There is no abdominal tenderness. There is no right CVA tenderness, left CVA tenderness, guarding or rebound.   Musculoskeletal:         General: Normal range of motion.      Cervical back: Normal range of motion. No rigidity.   Skin:     General: Skin is warm and dry.   Neurological:      General: No focal deficit  present.      Mental Status: He is alert and oriented to person, place, and time.      Motor: No weakness.      Coordination: Coordination normal.      Gait: Gait normal.   Psychiatric:         Mood and Affect: Mood normal.         Behavior: Behavior normal.         Thought Content: Thought content normal.          Assessment and Plan     1. Microscopic hematuria  -     Urine culture  -     CT Abdomen Pelvis W Wo Contrast; Future; Expected date: 11/21/2024    2. Frequency of urination  -     Urine culture    3. Nocturia  -     Urine culture               1. Urine culture  2. CT abdomen and pelvis with and without IV contrast to evaluate hematuria  3. Schedule cystoscopy with Dr. Chris Moore 1 week after CT results  4. Further recommendations after the hematuria workup is complete

## 2024-11-23 LAB — UA COMPLETE W REFLEX CULTURE PNL UR: NORMAL

## 2024-11-25 ENCOUNTER — TELEPHONE (OUTPATIENT)
Dept: UROLOGY | Facility: CLINIC | Age: 29
End: 2024-11-25

## 2024-11-25 ENCOUNTER — PATIENT MESSAGE (OUTPATIENT)
Dept: UROLOGY | Facility: CLINIC | Age: 29
End: 2024-11-25

## 2024-11-25 NOTE — TELEPHONE ENCOUNTER
----- Message from Jaden Walsh NP sent at 11/25/2024  5:20 AM CST -----  Please notify patient that his urine culture was negative, thanks     Patient returned call as I was typing unsuccessful note in chart.  He confirmed that he has seen results in my chart.  He confirmed upcoming CT on 12/18/2024 at 8:00 AM.

## 2024-11-25 NOTE — TELEPHONE ENCOUNTER
----- Message from Jaden Walsh NP sent at 11/25/2024  5:20 AM CST -----  Please notify patient that his urine culture was negative, thanks

## 2024-12-26 DIAGNOSIS — I10 HYPERTENSION, UNSPECIFIED TYPE: ICD-10-CM

## 2024-12-26 DIAGNOSIS — M54.42 ACUTE MIDLINE LOW BACK PAIN WITH LEFT-SIDED SCIATICA: ICD-10-CM

## 2024-12-26 DIAGNOSIS — G89.29 CHRONIC LOW BACK PAIN WITHOUT SCIATICA, UNSPECIFIED BACK PAIN LATERALITY: ICD-10-CM

## 2024-12-26 DIAGNOSIS — M54.50 CHRONIC LOW BACK PAIN WITHOUT SCIATICA, UNSPECIFIED BACK PAIN LATERALITY: ICD-10-CM

## 2024-12-30 RX ORDER — TIZANIDINE 4 MG/1
TABLET ORAL
Qty: 60 TABLET | Refills: 1 | Status: SHIPPED | OUTPATIENT
Start: 2024-12-30

## 2024-12-30 RX ORDER — GABAPENTIN 300 MG/1
300 CAPSULE ORAL 3 TIMES DAILY
Qty: 90 CAPSULE | Refills: 5 | Status: SHIPPED | OUTPATIENT
Start: 2024-12-30

## 2025-06-12 NOTE — ASSESSMENT & PLAN NOTE
07/1  - Reports that his lower back pain has subsided since finishing PT  -Seen by Orthopedic that recommend Gabapentin along with PT  -Patient is a  and will like to return back to work  -Advised to use the gabapentin with caution with driving  - At this time patient can return back to work without restriction     
.